# Patient Record
Sex: FEMALE | Race: WHITE | Employment: FULL TIME | URBAN - METROPOLITAN AREA
[De-identification: names, ages, dates, MRNs, and addresses within clinical notes are randomized per-mention and may not be internally consistent; named-entity substitution may affect disease eponyms.]

---

## 2020-11-27 LAB — SARS-COV-2: POSITIVE

## 2020-12-12 ENCOUNTER — APPOINTMENT (OUTPATIENT)
Dept: CT IMAGING | Age: 30
DRG: 177 | End: 2020-12-12
Payer: COMMERCIAL

## 2020-12-12 ENCOUNTER — APPOINTMENT (OUTPATIENT)
Dept: GENERAL RADIOLOGY | Age: 30
DRG: 177 | End: 2020-12-12
Payer: COMMERCIAL

## 2020-12-12 ENCOUNTER — HOSPITAL ENCOUNTER (INPATIENT)
Age: 30
LOS: 3 days | Discharge: HOSPICE | DRG: 177 | End: 2020-12-15
Attending: EMERGENCY MEDICINE | Admitting: INTERNAL MEDICINE
Payer: COMMERCIAL

## 2020-12-12 PROBLEM — A41.9 SEPSIS (HCC): Status: ACTIVE | Noted: 2020-12-12

## 2020-12-12 LAB
A/G RATIO: 1.4 (ref 1.1–2.2)
ALBUMIN SERPL-MCNC: 4.5 G/DL (ref 3.4–5)
ALP BLD-CCNC: 47 U/L (ref 40–129)
ALT SERPL-CCNC: 9 U/L (ref 10–40)
ANION GAP SERPL CALCULATED.3IONS-SCNC: 12 MMOL/L (ref 3–16)
AST SERPL-CCNC: 12 U/L (ref 15–37)
BACTERIA: ABNORMAL /HPF
BASE EXCESS VENOUS: -3.3 MMOL/L (ref -3–3)
BASOPHILS ABSOLUTE: 0 K/UL (ref 0–0.2)
BASOPHILS RELATIVE PERCENT: 0.7 %
BILIRUB SERPL-MCNC: 0.5 MG/DL (ref 0–1)
BILIRUBIN URINE: NEGATIVE
BLOOD, URINE: NEGATIVE
BUN BLDV-MCNC: 12 MG/DL (ref 7–20)
CALCIUM SERPL-MCNC: 9.1 MG/DL (ref 8.3–10.6)
CARBOXYHEMOGLOBIN: 2 % (ref 0–1.5)
CHLORIDE BLD-SCNC: 106 MMOL/L (ref 99–110)
CLARITY: CLEAR
CO2: 21 MMOL/L (ref 21–32)
COLOR: YELLOW
CREAT SERPL-MCNC: 0.8 MG/DL (ref 0.6–1.1)
D DIMER: 372 NG/ML DDU (ref 0–229)
EOSINOPHILS ABSOLUTE: 0.1 K/UL (ref 0–0.6)
EOSINOPHILS RELATIVE PERCENT: 1.9 %
EPITHELIAL CELLS, UA: ABNORMAL /HPF (ref 0–5)
GFR AFRICAN AMERICAN: >60
GFR NON-AFRICAN AMERICAN: >60
GLOBULIN: 3.2 G/DL
GLUCOSE BLD-MCNC: 123 MG/DL (ref 70–99)
GLUCOSE URINE: NEGATIVE MG/DL
HCG QUALITATIVE: NEGATIVE
HCO3 VENOUS: 21.4 MMOL/L (ref 23–29)
HCT VFR BLD CALC: 40 % (ref 36–48)
HEMOGLOBIN: 13.5 G/DL (ref 12–16)
KETONES, URINE: NEGATIVE MG/DL
LACTIC ACID, SEPSIS: 1.3 MMOL/L (ref 0.4–1.9)
LACTIC ACID, SEPSIS: 3.2 MMOL/L (ref 0.4–1.9)
LEUKOCYTE ESTERASE, URINE: ABNORMAL
LYMPHOCYTES ABSOLUTE: 1.7 K/UL (ref 1–5.1)
LYMPHOCYTES RELATIVE PERCENT: 26.8 %
MAGNESIUM: 1.9 MG/DL (ref 1.8–2.4)
MCH RBC QN AUTO: 30.8 PG (ref 26–34)
MCHC RBC AUTO-ENTMCNC: 33.7 G/DL (ref 31–36)
MCV RBC AUTO: 91.4 FL (ref 80–100)
METHEMOGLOBIN VENOUS: 0.5 %
MICROSCOPIC EXAMINATION: YES
MONOCYTES ABSOLUTE: 0.4 K/UL (ref 0–1.3)
MONOCYTES RELATIVE PERCENT: 6.6 %
NEUTROPHILS ABSOLUTE: 4.1 K/UL (ref 1.7–7.7)
NEUTROPHILS RELATIVE PERCENT: 64 %
NITRITE, URINE: POSITIVE
O2 CONTENT, VEN: 19 VOL %
O2 SAT, VEN: 100 %
O2 THERAPY: ABNORMAL
PCO2, VEN: 36.8 MMHG (ref 40–50)
PDW BLD-RTO: 12.6 % (ref 12.4–15.4)
PH UA: 6 (ref 5–8)
PH VENOUS: 7.37 (ref 7.35–7.45)
PLATELET # BLD: 265 K/UL (ref 135–450)
PMV BLD AUTO: 9.9 FL (ref 5–10.5)
PO2, VEN: 160 MMHG (ref 25–40)
POTASSIUM REFLEX MAGNESIUM: 3.3 MMOL/L (ref 3.5–5.1)
PRO-BNP: 61 PG/ML (ref 0–124)
PROCALCITONIN: 0.03 NG/ML (ref 0–0.15)
PROTEIN UA: NEGATIVE MG/DL
RBC # BLD: 4.38 M/UL (ref 4–5.2)
RBC UA: ABNORMAL /HPF (ref 0–4)
SODIUM BLD-SCNC: 139 MMOL/L (ref 136–145)
SPECIFIC GRAVITY UA: 1.01 (ref 1–1.03)
TCO2 CALC VENOUS: 51 MMOL/L
TOTAL PROTEIN: 7.7 G/DL (ref 6.4–8.2)
TROPONIN: <0.01 NG/ML
TSH REFLEX: 1.15 UIU/ML (ref 0.27–4.2)
URINE REFLEX TO CULTURE: ABNORMAL
URINE TYPE: ABNORMAL
UROBILINOGEN, URINE: 0.2 E.U./DL
WBC # BLD: 6.4 K/UL (ref 4–11)
WBC UA: ABNORMAL /HPF (ref 0–5)

## 2020-12-12 PROCEDURE — 6370000000 HC RX 637 (ALT 250 FOR IP): Performed by: PHYSICIAN ASSISTANT

## 2020-12-12 PROCEDURE — 93005 ELECTROCARDIOGRAM TRACING: CPT | Performed by: EMERGENCY MEDICINE

## 2020-12-12 PROCEDURE — 6370000000 HC RX 637 (ALT 250 FOR IP): Performed by: INTERNAL MEDICINE

## 2020-12-12 PROCEDURE — 2580000003 HC RX 258: Performed by: NURSE PRACTITIONER

## 2020-12-12 PROCEDURE — 6360000002 HC RX W HCPCS: Performed by: PHYSICIAN ASSISTANT

## 2020-12-12 PROCEDURE — 80053 COMPREHEN METABOLIC PANEL: CPT

## 2020-12-12 PROCEDURE — 84484 ASSAY OF TROPONIN QUANT: CPT

## 2020-12-12 PROCEDURE — 2580000003 HC RX 258: Performed by: INTERNAL MEDICINE

## 2020-12-12 PROCEDURE — 1200000000 HC SEMI PRIVATE

## 2020-12-12 PROCEDURE — 71045 X-RAY EXAM CHEST 1 VIEW: CPT

## 2020-12-12 PROCEDURE — 6360000002 HC RX W HCPCS: Performed by: INTERNAL MEDICINE

## 2020-12-12 PROCEDURE — 6360000004 HC RX CONTRAST MEDICATION: Performed by: EMERGENCY MEDICINE

## 2020-12-12 PROCEDURE — 82803 BLOOD GASES ANY COMBINATION: CPT

## 2020-12-12 PROCEDURE — 84443 ASSAY THYROID STIM HORMONE: CPT

## 2020-12-12 PROCEDURE — 2580000003 HC RX 258: Performed by: PHYSICIAN ASSISTANT

## 2020-12-12 PROCEDURE — 85025 COMPLETE CBC W/AUTO DIFF WBC: CPT

## 2020-12-12 PROCEDURE — 85379 FIBRIN DEGRADATION QUANT: CPT

## 2020-12-12 PROCEDURE — 36415 COLL VENOUS BLD VENIPUNCTURE: CPT

## 2020-12-12 PROCEDURE — 84145 PROCALCITONIN (PCT): CPT

## 2020-12-12 PROCEDURE — 81001 URINALYSIS AUTO W/SCOPE: CPT

## 2020-12-12 PROCEDURE — 96374 THER/PROPH/DIAG INJ IV PUSH: CPT

## 2020-12-12 PROCEDURE — 71260 CT THORAX DX C+: CPT

## 2020-12-12 PROCEDURE — 84703 CHORIONIC GONADOTROPIN ASSAY: CPT

## 2020-12-12 PROCEDURE — 83605 ASSAY OF LACTIC ACID: CPT

## 2020-12-12 PROCEDURE — 99283 EMERGENCY DEPT VISIT LOW MDM: CPT

## 2020-12-12 PROCEDURE — 83880 ASSAY OF NATRIURETIC PEPTIDE: CPT

## 2020-12-12 PROCEDURE — 83735 ASSAY OF MAGNESIUM: CPT

## 2020-12-12 PROCEDURE — 87040 BLOOD CULTURE FOR BACTERIA: CPT

## 2020-12-12 RX ORDER — SODIUM CHLORIDE 0.9 % (FLUSH) 0.9 %
10 SYRINGE (ML) INJECTION PRN
Status: DISCONTINUED | OUTPATIENT
Start: 2020-12-12 | End: 2020-12-15 | Stop reason: HOSPADM

## 2020-12-12 RX ORDER — ACETAMINOPHEN 650 MG/1
650 SUPPOSITORY RECTAL EVERY 6 HOURS PRN
Status: DISCONTINUED | OUTPATIENT
Start: 2020-12-12 | End: 2020-12-15 | Stop reason: HOSPADM

## 2020-12-12 RX ORDER — SODIUM CHLORIDE, SODIUM LACTATE, POTASSIUM CHLORIDE, CALCIUM CHLORIDE 600; 310; 30; 20 MG/100ML; MG/100ML; MG/100ML; MG/100ML
INJECTION, SOLUTION INTRAVENOUS CONTINUOUS
Status: DISCONTINUED | OUTPATIENT
Start: 2020-12-12 | End: 2020-12-15 | Stop reason: HOSPADM

## 2020-12-12 RX ORDER — SODIUM CHLORIDE 0.9 % (FLUSH) 0.9 %
10 SYRINGE (ML) INJECTION EVERY 12 HOURS SCHEDULED
Status: DISCONTINUED | OUTPATIENT
Start: 2020-12-12 | End: 2020-12-15 | Stop reason: HOSPADM

## 2020-12-12 RX ORDER — MAGNESIUM SULFATE 1 G/100ML
1 INJECTION INTRAVENOUS ONCE
Status: COMPLETED | OUTPATIENT
Start: 2020-12-12 | End: 2020-12-12

## 2020-12-12 RX ORDER — LANOLIN ALCOHOL/MO/W.PET/CERES
3 CREAM (GRAM) TOPICAL NIGHTLY PRN
COMMUNITY

## 2020-12-12 RX ORDER — 0.9 % SODIUM CHLORIDE 0.9 %
500 INTRAVENOUS SOLUTION INTRAVENOUS ONCE
Status: COMPLETED | OUTPATIENT
Start: 2020-12-12 | End: 2020-12-13

## 2020-12-12 RX ORDER — ACETAMINOPHEN 325 MG/1
650 TABLET ORAL EVERY 6 HOURS PRN
Status: DISCONTINUED | OUTPATIENT
Start: 2020-12-12 | End: 2020-12-15 | Stop reason: HOSPADM

## 2020-12-12 RX ORDER — DEXAMETHASONE SODIUM PHOSPHATE 10 MG/ML
10 INJECTION, SOLUTION INTRAMUSCULAR; INTRAVENOUS ONCE
Status: COMPLETED | OUTPATIENT
Start: 2020-12-12 | End: 2020-12-12

## 2020-12-12 RX ORDER — ONDANSETRON 2 MG/ML
4 INJECTION INTRAMUSCULAR; INTRAVENOUS EVERY 6 HOURS PRN
Status: DISCONTINUED | OUTPATIENT
Start: 2020-12-12 | End: 2020-12-15 | Stop reason: HOSPADM

## 2020-12-12 RX ORDER — PROMETHAZINE HYDROCHLORIDE 25 MG/1
12.5 TABLET ORAL EVERY 6 HOURS PRN
Status: DISCONTINUED | OUTPATIENT
Start: 2020-12-12 | End: 2020-12-15 | Stop reason: HOSPADM

## 2020-12-12 RX ORDER — 0.9 % SODIUM CHLORIDE 0.9 %
2000 INTRAVENOUS SOLUTION INTRAVENOUS ONCE
Status: COMPLETED | OUTPATIENT
Start: 2020-12-12 | End: 2020-12-12

## 2020-12-12 RX ORDER — POTASSIUM CHLORIDE 20 MEQ/1
40 TABLET, EXTENDED RELEASE ORAL ONCE
Status: COMPLETED | OUTPATIENT
Start: 2020-12-12 | End: 2020-12-12

## 2020-12-12 RX ORDER — GUAIFENESIN/DEXTROMETHORPHAN 100-10MG/5
5 SYRUP ORAL EVERY 4 HOURS PRN
Status: DISCONTINUED | OUTPATIENT
Start: 2020-12-12 | End: 2020-12-15 | Stop reason: HOSPADM

## 2020-12-12 RX ORDER — POLYETHYLENE GLYCOL 3350 17 G/17G
17 POWDER, FOR SOLUTION ORAL DAILY PRN
Status: DISCONTINUED | OUTPATIENT
Start: 2020-12-12 | End: 2020-12-15 | Stop reason: HOSPADM

## 2020-12-12 RX ADMIN — Medication 1 G: at 13:17

## 2020-12-12 RX ADMIN — DEXAMETHASONE SODIUM PHOSPHATE 10 MG: 10 INJECTION, SOLUTION INTRAMUSCULAR; INTRAVENOUS at 14:42

## 2020-12-12 RX ADMIN — SODIUM CHLORIDE, POTASSIUM CHLORIDE, SODIUM LACTATE AND CALCIUM CHLORIDE: 600; 310; 30; 20 INJECTION, SOLUTION INTRAVENOUS at 14:42

## 2020-12-12 RX ADMIN — ENOXAPARIN SODIUM 40 MG: 40 INJECTION SUBCUTANEOUS at 20:32

## 2020-12-12 RX ADMIN — IOPAMIDOL 75 ML: 755 INJECTION, SOLUTION INTRAVENOUS at 12:36

## 2020-12-12 RX ADMIN — SODIUM CHLORIDE 2000 ML: 9 INJECTION, SOLUTION INTRAVENOUS at 12:21

## 2020-12-12 RX ADMIN — POTASSIUM CHLORIDE 40 MEQ: 1500 TABLET, EXTENDED RELEASE ORAL at 12:21

## 2020-12-12 RX ADMIN — ACETAMINOPHEN 650 MG: 325 TABLET ORAL at 20:32

## 2020-12-12 RX ADMIN — MAGNESIUM SULFATE HEPTAHYDRATE 1 G: 1 INJECTION, SOLUTION INTRAVENOUS at 16:48

## 2020-12-12 RX ADMIN — SODIUM CHLORIDE 500 ML: 9 INJECTION, SOLUTION INTRAVENOUS at 22:04

## 2020-12-12 ASSESSMENT — PAIN SCALES - GENERAL
PAINLEVEL_OUTOF10: 0

## 2020-12-12 ASSESSMENT — ENCOUNTER SYMPTOMS
SHORTNESS OF BREATH: 0
CHEST TIGHTNESS: 0
ABDOMINAL PAIN: 0
NAUSEA: 0
VOMITING: 0
DIARRHEA: 0

## 2020-12-12 NOTE — PROGRESS NOTES
Pt admitted in room 3320 from ED. Pt independent in room. Pt alert and oriented. Pt on IVF and IV abx. Pt reported SOB with exertion, this RN offered oxygen for comfort, pt refused it. Pt O2 sat>95% on room air. Bed is locked and placed in lowest position. Pt educated to call RN in needs. Verbalized understanding. Call light within reach. Will continue to monitor.

## 2020-12-12 NOTE — H&P
HOSPITALISTS HISTORY AND PHYSICAL    12/12/2020 2:34 PM    Patient Information:  Hyun Jackson is a 26 y.o. female [id number]  PCP:  No primary care provider on file. (Tel: None )    Chief complaint:    Chief Complaint   Patient presents with    Palpitations     Pt presents to the ED with reports of heart racing. Pt states that she was monitoring HR and said her HR went up to 178 today. Pt states that when this happens she feels dizzy and naseous. Reports having one small cup of coffee this morning. Denies any cardiac HX         History of Present Illness:  Yajaira Fernandez is a 26 y.o. female was diagnosed with Covid on November 25. Patient felt short of breath for 5 days had low-grade fevers during that time and generalized fatigue. Then patient felt much better however did have watery diarrhea since Covid was diagnosed about 2-3 times a day. Patient also has a pulse ox at home sats have been good 98 hours had persistent tachycardia 100 220 since Covid was diagnosed. This morning patient felt chest pressure along with increased dyspnea diaphoresis nausea and palpitations. She checked her pulse ox and her heart rate was up to 170. Patient also is complaining of increased shortness of breath when lying down flat. Has had increased frequency and urgency with urination no foul-smelling urine. Patient worked up in the ED including CT PE which was negative for pulmonary embolism but did show some signs of viral pneumonia          REVIEW OF SYSTEMS:   Constitutional: See above  ENT: Negative for rhinorrhea, epistaxis, hoarseness, sore throat.   Respiratory: See above  Cardiovascular: See above   gastrointestinal:See above   Genitourinary: Negative for polyuria, dysuria   Hematologic/Lymphatic: Negative for bleeding tendency, easy bruising  Musculoskeletal: Negative for myalgias and arthralgias  Neurologic: Negative for confusion,dysarthria. Skin: Negative for itching,rash  Psychiatric: Negative for depression,anxiety, agitation. Endocrine: Negative for polydipsia,polyuria,heat /cold intolerance. Past Medical History:   has no past medical history on file. Past Surgical History:   has a past surgical history that includes [place] tooth extraction ();  section; and laparoscopic appendectomy (13). Medications:  No current facility-administered medications on file prior to encounter. No current outpatient medications on file prior to encounter. Allergies:  No Known Allergies     Social History:  Patient Lives at home   reports that she has never smoked. She has never used smokeless tobacco. She reports that she does not drink alcohol or use drugs.      Family History:  family history includes Diabetes in her father; Heart Disease in her father; High Blood Pressure in her father, mother, and paternal grandfather; High Cholesterol in her father. ,     Physical Exam:  /67   Pulse 110   Temp 98.2 °F (36.8 °C) (Oral)   Resp 21   Ht 5' 1\" (1.549 m)   Wt 110 lb (49.9 kg)   SpO2 98%   BMI 20.78 kg/m²     Exam for window was COVID-19 pneumonia awake alert oriented x3 able to talk in full sentences no tachypnea or accessory muscle usage no gross focal neurological deficits    Labs:  CBC:   Lab Results   Component Value Date    WBC 6.4 2020    RBC 4.38 2020    HGB 13.5 2020    HCT 40.0 2020    MCV 91.4 2020    MCH 30.8 2020    MCHC 33.7 2020    RDW 12.6 2020     2020    MPV 9.9 2020     BMP:    Lab Results   Component Value Date     2020    K 3.3 2020     2020    CO2 21 2020    BUN 12 2020    CREATININE 0.8 2020    CALCIUM 9.1 2020    GFRAA >60 2020    LABGLOM >60 2020    GLUCOSE 123 2020     CT CHEST PULMONARY EMBOLISM W CONTRAST   Final Result

## 2020-12-12 NOTE — ED NOTES
Patient up walking to bathroom without difficulty. Gait steady. Respirations are easy and unlabored.        Michael Marquez RN  12/12/20 8212

## 2020-12-12 NOTE — ED PROVIDER NOTES
I independently performed a history and physical on [facility]. All diagnostic, treatment, and disposition decisions were made by myself in conjunction with the advanced practice provider. Briefly, this is a 26 y.o. female here for palpitation, nausea, lightheadedness, and chest pressure. Patient also had shortness of breath. This came on earlier today. The patient was diagnosed with Covid the day after Thanksgiving. However, she states she is been mostly asymptomatic since then. She is never had anything like she presented with today. She denies symptoms proceeding today. .    On exam, the patient appears well-hydrated, well-nourished, and in no acute distress. Mucous membranes are moist. Speech is clear. Breathing is unlabored. Skin is dry. Mental status is normal. The patient moves all extremities. Face is symmetric without droop. Heart is tachycardic, but regular. Lungs are CTAB. EKG  The Ekg interpreted by me in the absence of a cardiologist shows. sinus tachycardia, hfrr=611  Axis is   Normal  QTc is  normal  Intervals and Durations are unremarkable. No specific ST-T wave changes appreciated. No evidence of acute ischemia. No previous EKGs available for comparison. MDM  Patient chest x-ray was clear, but her CT showed evidence of bilateral lower lobe pneumonia. This may be viral in nature given her COVID-19 history. However, she is over 2 weeks since her original diagnosis, so this seems less likely. We are treating presumptively for bacterial pneumonia as well. In addition to this, the patient has a urinary tract infection. She did have a history of urinary frequency and hesitancy when I evaluated her at bedside. She meets criteria for sepsis with tachycardia and tachypnea. With her 2 sources of infection, we treated presumptively with IV antibiotics. Blood cultures lactic acid were sent. Her lactic acid was elevated, and indicating decreased tissue perfusion.   She received her 30 L/kg IV fluid bolus and felt better afterwards. She will still need admission for further treatment. FINAL IMPRESSION  1. Sepsis, due to unspecified organism, unspecified whether acute organ dysfunction present ([address]    2. Bacterial urinary tract infection    3. COVID-19 virus infection confirmed 11/27/2020    4. Multifocal pneumonia    5. Palpitations        Blood pressure 108/67, pulse 110, temperature 98.2 °F (36.8 °C), temperature source Oral, resp. rate 21, height 5' 1\" (1.549 m), weight 110 lb (49.9 kg), SpO2 98 %, not currently breastfeeding.      For further details of [address] [place] [address],[address] emergency department encounter, please see documentation by advanced practice provider, LUCAS Wright.       Terence Hugo MD  12/12/20 6640

## 2020-12-12 NOTE — ED NOTES
Report given to 3A RN at bedside. Pt alert and oriented and shows no signs of distress at time of transfer to [facility] [address]. . Pt taken to room by 3A RN in wheelchair.         Monae Alegria RN  12/12/20 2035 NADIA Middleton RN  12/12/20 8963

## 2020-12-12 NOTE — ED PROVIDER NOTES
that her heart rate was that high today. Patient states that she has had a small cup of coffee but not any significant stimulant use. Patient states at the present time she is not currently experiencing any kind of substernal chest pain palpitations or shortness of breath. She denies a history of unilateral leg pain or swelling. She denies a history of DVT and or PE. She reports that her only known risk factor for thromboembolic events is her recent diagnosis of Covid. Patient states that she has no additional complaints voiced at the present time. She is resting comfortably and reports that she is pain-free. Nursing Notes were all reviewed and agreed with or any disagreements were addressed in the HPI. REVIEW OF SYSTEMS    (2-9 systems for level 4, 10 or more for level 5)     Review of Systems   Constitutional: Negative for activity change, chills and fever. Respiratory: Negative for chest tightness and shortness of breath. Cardiovascular: Positive for palpitations. Negative for chest pain. Gastrointestinal: Negative for abdominal pain, diarrhea, nausea and vomiting. Genitourinary: Negative for dysuria and flank pain. Neurological: Negative for seizures and headaches. Positives and Pertinent negatives as per HPI. Except as noted above in the ROS, all other systems were reviewed and negative. PAST MEDICAL HISTORY   History reviewed. No pertinent past medical history. SURGICAL HISTORY     Past Surgical History:   Procedure Laterality Date     SECTION      LAPAROSCOPIC APPENDECTOMY  13    WISDOM TOOTH EXTRACTION           CURRENTMEDICATIONS       Previous Medications    No medications on file         ALLERGIES     Patient has no known allergies.     FAMILYHISTORY       Family History   Problem Relation Age of Onset    High Blood Pressure Mother     Diabetes Father     Heart Disease Father     High Blood Pressure Father     High Cholesterol Father     High Blood Pressure Paternal Grandfather           SOCIAL HISTORY       Social History     Tobacco Use    Smoking status: Never Smoker    Smokeless tobacco: Never Used   Substance Use Topics    Alcohol use: No     Comment: socially     Drug use: No       SCREENINGS             PHYSICAL EXAM    (up to 7 for level 4, 8 or more for level 5)     ED Triage Vitals [12/12/20 1056]   BP Temp Temp Source Pulse Resp SpO2 Height Weight   121/80 98.2 °F (36.8 °C) Oral 142 18 98 % 5' 1\" (1.549 m) 110 lb (49.9 kg)       Physical Exam  Vitals signs and nursing note reviewed. Constitutional:       General: She is awake. She is not in acute distress. Appearance: Normal appearance. She is well-developed and normal weight. She is not ill-appearing or diaphoretic. HENT:      Head: Normocephalic and atraumatic. Right Ear: External ear normal.      Left Ear: External ear normal.   Eyes:      General: No scleral icterus. Right eye: No discharge. Left eye: No discharge. Conjunctiva/sclera: Conjunctivae normal.   Neck:      Musculoskeletal: Normal range of motion. Vascular: No JVD. Cardiovascular:      Rate and Rhythm: Regular rhythm. Tachycardia present. Heart sounds: No murmur. No friction rub. No gallop. Comments: Bilateral calves supple. Negative Homans bilaterally. No evidence of peripheral edema. Pulmonary:      Effort: Pulmonary effort is normal. No accessory muscle usage or respiratory distress. Breath sounds: Normal breath sounds. No wheezing, rhonchi or rales. Abdominal:      General: There is no distension. Palpations: Abdomen is soft. Abdomen is not rigid. There is no mass. Tenderness: There is no abdominal tenderness. There is no guarding or rebound. Skin:     General: Skin is warm and dry. Neurological:      Mental Status: She is alert and oriented to person, place, and time. GCS: GCS eye subscore is 4. GCS verbal subscore is 5.  GCS motor subscore is 6. Cranial Nerves: No cranial nerve deficit. Sensory: No sensory deficit. Coordination: Coordination normal.   Psychiatric:         Behavior: Behavior normal. Behavior is cooperative.          DIAGNOSTIC RESULTS   LABS:    Labs Reviewed   COMPREHENSIVE METABOLIC PANEL W/ REFLEX TO MG FOR LOW K - Abnormal; Notable for the following components:       Result Value    Potassium reflex Magnesium 3.3 (*)     Glucose 123 (*)     ALT 9 (*)     AST 12 (*)     All other components within normal limits    Narrative:     Performed at:  [facility] [address] [facility] [address]   Phone [phone]   URINE RT REFLEX TO CULTURE - Abnormal; Notable for the following components:    Nitrite, Urine POSITIVE (*)     Leukocyte Esterase, Urine SMALL (*)     All other components within normal limits    Narrative:     Performed at:  [facility] [address] [facility] [place]   Phone [phone]   BLOOD GAS, VENOUS - Abnormal; Notable for the following components:    pCO2, Stiven 36.8 (*)     pO2, Stiven 160.0 (*)     HCO3, Venous 21.4 (*)     Base Excess, Stiven -3.3 (*)     Carboxyhemoglobin 2.0 (*)     All other components within normal limits    Narrative:     Performed at:  [facility] [address]   Phone [phone]   LACTATE, SEPSIS - Abnormal; Notable for the following components:    Lactic Acid, Sepsis 3.2 (*)     All other components within normal limits    Narrative:     Performed at:  [facility] [address]   Phone [phone]   D-DIMER, QUANTITATIVE - Abnormal; Notable for the following components:    D-Dimer, Quant 372 (*)     All other components within normal limits    Narrative:     Performed at:  [facility] [address]   Phone [phone]   MICROSCOPIC URINALYSIS - Abnormal; Notable for the following components:    Bacteria, UA 3+ (*)     All other components within normal limits    Narrative:     Performed at:  [facility]  [address], [place] [employer]   Phone [phone]   CULTURE, BLOOD 1   CULTURE, BLOOD 2   CBC WITH AUTO DIFFERENTIAL    Narrative:     Performed at:  [facility]  [address]   Phone [phone]   TROPONIN    Narrative:     Performed at:  [facility] [address] [employer], [address]   Phone [phone]   BRAIN NATRIURETIC PEPTIDE    Narrative:     Performed at:  [facility] [address], [place] [employer]   Phone [phone]   HCG, SERUM, QUALITATIVE    Narrative:     Performed at:  [facility] [address], [place] [employer]   Phone [phone]   MAGNESIUM    Narrative:     Performed at:  [facility] [address], [place] [employer]   Phone [phone]   TSH WITH REFLEX   LACTATE, SEPSIS       All other labs were within normal range or not returned as of this dictation. EKG: All EKG's are interpreted by the Emergency Department Physician in the absence of a cardiologist.  Please see their note for interpretation of EKG. RADIOLOGY:   Non-plain film images such as CT, Ultrasound and MRI are read by the radiologist. Plain radiographic images are visualized and preliminarily interpreted by the ED Provider with the below findings:        Interpretation per the Radiologist below, if available at the time of this note:    CT CHEST PULMONARY EMBOLISM W CONTRAST   Final Result   Patchy heterogeneous bilateral lower lobe consolidation, likely reflecting   viral pneumonia given patient history of COVID-19 infection.       No findings to suggest large central pulmonary embolism. XR CHEST PORTABLE   Final Result   1. No acute abnormality. Xr Chest Portable    Result Date: 12/12/2020  EXAMINATION: ONE XRAY VIEW OF THE CHEST 12/12/2020 11:14 am COMPARISON: None available. HISTORY: ORDERING SYSTEM PROVIDED HISTORY: tachycardia and palpitations TECHNOLOGIST PROVIDED HISTORY: Reason for exam:->tachycardia and palpitations Reason for Exam: Palpitations (Pt presents to the ED with reports of heart racing. Pt states that she was monitoring HR and said her HR went up to 178 today. Pt states that when this happens she feels dizzy and naseous. Reports having one small cup of coffee this morning. Denies any cardiac HX ) Acuity: Unknown Type of Exam: Unknown FINDINGS: The lungs are clear. The cardiac silhouette is within normal limits. There is no pneumothorax or pleural effusion. 1.  No acute abnormality. PROCEDURES   Unless otherwise noted below, none     Procedures    CRITICAL CARE TIME   Because of the high probability of sudden clinical deterioration of the patients condition and to prevent further deterioration, my critical care time involved my full attention to the patients condition, and included chart data review, documentation, medication ordering, viewing the patients old records, reevaluation of the patient's cardiac, pulmonary, and neurological status. Reassessing vital signs. Consutlations with off service physician. Ordering, interpreting reviewing diagnostic testing. Therefore my critical care time was 35 minutes of direct attention to the patients condition and did not include time spent on procedures.     SEP-1 CORE MEASURE DATA    Classification: sepsis    Amount of fluids ordered: at least 30mL/kg based on ideal body weight due to obesity defined as BMI >30 (patient's BMI is Body mass index is 20.78 kg/m².)    Time at which sepsis was identified: 1301    Broad-spectrum antibiotics chosen: based on sepsis order-set for a suspected source of: UTI    Repeat lactate level: pending    On reassessment after fluid resuscitation:   Vitals update: /67   Pulse 110   Temp 98.2 °F (36.8 °C) (Oral)   Resp 21   Ht 5' 1\" (1.549 m)   Wt 110 lb (49.9 kg)   SpO2 98%   BMI 20.78 kg/m² , cardiopulmonary exam: Improving tachycardia, capillary refill: brisk, peripheral pulses: Intact, skin examination: Unchanged    CONSULTS:  None      EMERGENCY DEPARTMENT COURSE and DIFFERENTIAL DIAGNOSIS/MDM:   Vitals:    Vitals:    12/12/20 1056 12/12/20 1217 12/12/20 1230   BP: 121/80 123/71 108/67   Pulse: 142 123 110   Resp: 18 23 21   Temp: 98.2 °F (36.8 °C)     TempSrc: Oral     SpO2: 98%     Weight: 110 lb (49.9 kg)     Height: 5' 1\" (1.549 m)         Patient was given the following medications:  Medications   cefTRIAXone (ROCEPHIN) 1 g in sterile water 10 mL IV syringe (1 g Intravenous Given 12/12/20 1317)   azithromycin (ZITHROMAX) 500 mg in D5W 250ml Vial Mate (has no administration in time range)   dexamethasone (PF) (DECADRON) injection 10 mg (has no administration in time range)   lactated ringers infusion (has no administration in time range)   0.9 % sodium chloride bolus (2,000 mLs Intravenous New Bag 12/12/20 1221)   potassium chloride (KLOR-CON M) extended release tablet 40 mEq (40 mEq Oral Given 12/12/20 1221)   iopamidol (ISOVUE-370) 76 % injection 75 mL (75 mLs Intravenous Given 12/12/20 1236)           The patient's detailed history of present illness is documented as above. Upon arrival to the emergency department the patient's vital signs are as documented. The patient is noted to be hemodynamically stable and afebrile. Physical examination findings are as above. IV access was obtained. Laboratory testing and work-up was initiated. Initial EKG performed upon arrival demonstrates a sinus tachycardia with a rate of 121. No evidence of acute ST elevation.   Please see attending physician details for further EKG interpretation in

## 2020-12-13 LAB
ANION GAP SERPL CALCULATED.3IONS-SCNC: 6 MMOL/L (ref 3–16)
BASOPHILS ABSOLUTE: 0 K/UL (ref 0–0.2)
BASOPHILS RELATIVE PERCENT: 0.5 %
BILIRUBIN URINE: NEGATIVE
BLOOD, URINE: ABNORMAL
BUN BLDV-MCNC: 4 MG/DL (ref 7–20)
CALCIUM SERPL-MCNC: 8.4 MG/DL (ref 8.3–10.6)
CHLORIDE BLD-SCNC: 109 MMOL/L (ref 99–110)
CLARITY: CLEAR
CO2: 24 MMOL/L (ref 21–32)
COLOR: YELLOW
CREAT SERPL-MCNC: 0.6 MG/DL (ref 0.6–1.1)
EKG ATRIAL RATE: 121 BPM
EKG DIAGNOSIS: NORMAL
EKG P AXIS: 56 DEGREES
EKG P-R INTERVAL: 136 MS
EKG Q-T INTERVAL: 434 MS
EKG QRS DURATION: 70 MS
EKG QTC CALCULATION (BAZETT): 616 MS
EKG R AXIS: 63 DEGREES
EKG T AXIS: 68 DEGREES
EKG VENTRICULAR RATE: 121 BPM
EOSINOPHILS ABSOLUTE: 0 K/UL (ref 0–0.6)
EOSINOPHILS RELATIVE PERCENT: 0 %
EPITHELIAL CELLS, UA: 1 /HPF (ref 0–5)
GFR AFRICAN AMERICAN: >60
GFR NON-AFRICAN AMERICAN: >60
GLUCOSE BLD-MCNC: 126 MG/DL (ref 70–99)
GLUCOSE URINE: NEGATIVE MG/DL
HCT VFR BLD CALC: 36.1 % (ref 36–48)
HEMOGLOBIN: 12 G/DL (ref 12–16)
HYALINE CASTS: 1 /LPF (ref 0–8)
KETONES, URINE: NEGATIVE MG/DL
LACTIC ACID: 1.2 MMOL/L (ref 0.4–2)
LEUKOCYTE ESTERASE, URINE: NEGATIVE
LYMPHOCYTES ABSOLUTE: 1.5 K/UL (ref 1–5.1)
LYMPHOCYTES RELATIVE PERCENT: 15.3 %
MCH RBC QN AUTO: 30.9 PG (ref 26–34)
MCHC RBC AUTO-ENTMCNC: 33.2 G/DL (ref 31–36)
MCV RBC AUTO: 93.1 FL (ref 80–100)
MICROSCOPIC EXAMINATION: YES
MONOCYTES ABSOLUTE: 0.6 K/UL (ref 0–1.3)
MONOCYTES RELATIVE PERCENT: 6.6 %
NEUTROPHILS ABSOLUTE: 7.6 K/UL (ref 1.7–7.7)
NEUTROPHILS RELATIVE PERCENT: 77.6 %
NITRITE, URINE: NEGATIVE
PDW BLD-RTO: 12.7 % (ref 12.4–15.4)
PH UA: 7.5 (ref 5–8)
PLATELET # BLD: 224 K/UL (ref 135–450)
PMV BLD AUTO: 10.1 FL (ref 5–10.5)
POTASSIUM REFLEX MAGNESIUM: 4 MMOL/L (ref 3.5–5.1)
PROTEIN UA: NEGATIVE MG/DL
RBC # BLD: 3.87 M/UL (ref 4–5.2)
RBC UA: 85 /HPF (ref 0–4)
SODIUM BLD-SCNC: 139 MMOL/L (ref 136–145)
SPECIFIC GRAVITY UA: 1.01 (ref 1–1.03)
URINE TYPE: ABNORMAL
UROBILINOGEN, URINE: 0.2 E.U./DL
WBC # BLD: 9.8 K/UL (ref 4–11)
WBC UA: 3 /HPF (ref 0–5)

## 2020-12-13 PROCEDURE — 81001 URINALYSIS AUTO W/SCOPE: CPT

## 2020-12-13 PROCEDURE — 87086 URINE CULTURE/COLONY COUNT: CPT

## 2020-12-13 PROCEDURE — 36415 COLL VENOUS BLD VENIPUNCTURE: CPT

## 2020-12-13 PROCEDURE — 6370000000 HC RX 637 (ALT 250 FOR IP): Performed by: INTERNAL MEDICINE

## 2020-12-13 PROCEDURE — 93010 ELECTROCARDIOGRAM REPORT: CPT | Performed by: INTERNAL MEDICINE

## 2020-12-13 PROCEDURE — 2580000003 HC RX 258: Performed by: INTERNAL MEDICINE

## 2020-12-13 PROCEDURE — 1200000000 HC SEMI PRIVATE

## 2020-12-13 PROCEDURE — 2580000003 HC RX 258: Performed by: NURSE PRACTITIONER

## 2020-12-13 PROCEDURE — 83605 ASSAY OF LACTIC ACID: CPT

## 2020-12-13 PROCEDURE — 6360000002 HC RX W HCPCS: Performed by: INTERNAL MEDICINE

## 2020-12-13 PROCEDURE — 6360000002 HC RX W HCPCS: Performed by: PHYSICIAN ASSISTANT

## 2020-12-13 PROCEDURE — 80048 BASIC METABOLIC PNL TOTAL CA: CPT

## 2020-12-13 PROCEDURE — 85025 COMPLETE CBC W/AUTO DIFF WBC: CPT

## 2020-12-13 RX ADMIN — ACETAMINOPHEN 650 MG: 325 TABLET ORAL at 06:48

## 2020-12-13 RX ADMIN — Medication 10 ML: at 08:27

## 2020-12-13 RX ADMIN — ENOXAPARIN SODIUM 40 MG: 40 INJECTION SUBCUTANEOUS at 08:27

## 2020-12-13 RX ADMIN — Medication 1 G: at 11:47

## 2020-12-13 RX ADMIN — PROMETHAZINE HYDROCHLORIDE 12.5 MG: 25 TABLET ORAL at 18:43

## 2020-12-13 RX ADMIN — SODIUM CHLORIDE, POTASSIUM CHLORIDE, SODIUM LACTATE AND CALCIUM CHLORIDE: 600; 310; 30; 20 INJECTION, SOLUTION INTRAVENOUS at 08:27

## 2020-12-13 RX ADMIN — SODIUM CHLORIDE, POTASSIUM CHLORIDE, SODIUM LACTATE AND CALCIUM CHLORIDE: 600; 310; 30; 20 INJECTION, SOLUTION INTRAVENOUS at 02:10

## 2020-12-13 ASSESSMENT — PAIN SCALES - GENERAL
PAINLEVEL_OUTOF10: 0
PAINLEVEL_OUTOF10: 4
PAINLEVEL_OUTOF10: 0
PAINLEVEL_OUTOF10: 0

## 2020-12-13 ASSESSMENT — PAIN DESCRIPTION - DESCRIPTORS: DESCRIPTORS: HEADACHE

## 2020-12-13 NOTE — PROGRESS NOTES
[facility] PROGRESS NOTE    12/13/2020 12:47 PM        Name: Jose Luis Kaufman . Admitted: 12/12/2020  Primary Care Provider: No primary care provider on file. (Tel: None)            Subjective:     Tachycardic overnight up to 150s now in the low 100s not having any chest pain or vomiting    Reviewed interval ancillary notes    Current Medications      cefTRIAXone (ROCEPHIN) 1 g in sterile water 10 mL IV syringe, Q24H      lactated ringers infusion, Continuous      sodium chloride flush 0.9 % injection 10 mL, 2 times per day      sodium chloride flush 0.9 % injection 10 mL, PRN      enoxaparin (LOVENOX) injection 40 mg, Daily      promethazine (PHENERGAN) tablet 12.5 mg, Q6H PRN    Or      ondansetron (ZOFRAN) injection 4 mg, Q6H PRN      polyethylene glycol (GLYCOLAX) packet 17 g, Daily PRN      acetaminophen (TYLENOL) tablet 650 mg, Q6H PRN    Or      acetaminophen (TYLENOL) suppository 650 mg, Q6H PRN      perflutren lipid microspheres (DEFINITY) injection 1.65 mg, ONCE PRN      guaiFENesin-dextromethorphan (ROBITUSSIN DM) 100-10 MG/5ML syrup 5 mL, Q4H PRN        Objective:  /71   Pulse 93   Temp 98.4 °F (36.9 °C) (Oral)   Resp 16   Ht 5' 1\" (1.549 m)   Wt 116 lb 9.6 oz (52.9 kg)   SpO2 99%   BMI 22.03 kg/m²   No intake or output data in the 24 hours ending 12/13/20 1247   Wt Readings from Last 3 Encounters:   12/13/20 116 lb 9.6 oz (52.9 kg)     Examined from  window was COVID-19 pneumonia awake alert oriented x3 able to talk in full sentences no tachypnea or accessory muscle usage no gross focal neurological deficits  Labs and Tests:  CBC:   Recent Labs     12/12/20  1119 12/13/20  0607   WBC 6.4 9.8   HGB 13.5 12.0    224     BMP:    Recent Labs     12/12/20  1119 12/13/20  0607    139   K 3.3* 4.0    109   CO2 21 24   BUN 12 4*   CREATININE 0.8 0.6   GLUCOSE 123* 126* Hepatic:   Recent Labs     12/12/20  1119   AST 12*   ALT 9*   BILITOT 0.5   ALKPHOS 52     CT CHEST PULMONARY EMBOLISM W CONTRAST   Final Result   Patchy heterogeneous bilateral lower lobe consolidation, likely reflecting   viral pneumonia given patient history of COVID-19 infection. No findings to suggest large central pulmonary embolism. XR CHEST PORTABLE   Final Result   1. No acute abnormality. Recent imaging reviewed    Problem List  Active Problems:    Sepsis ([address]  Resolved Problems:    * No resolved hospital problems. *       Assessment & Plan:   Sepsis secondary to ? UTI   continue Rocephin    urine culture pending     Tacyhcardia: 3 of tachycardia to 170 with normal white blood cell count and normal blood pressure unsure i all sepsis induced.   Patient also endorses shortness of breath with lying down flat although normal proBNP we will get echocardiogram to rule out other cardiac causes patient has been tachycardic at baseline since Covid diagnosis of 100 -120  - echo still pending     covid 19 pna: diagnosed nov 25, on room air, no indication for steroids or remdesivir     Hypokalemia replace and recheck    Diet: DIET GENERAL;  Code:Full Code  DVT PPXlovenox  Disposition home pending work up      Geena Vaca MD   12/13/2020 12:47 PM

## 2020-12-14 LAB — URINE CULTURE, ROUTINE: NORMAL

## 2020-12-14 PROCEDURE — 2580000003 HC RX 258: Performed by: INTERNAL MEDICINE

## 2020-12-14 PROCEDURE — 6360000002 HC RX W HCPCS: Performed by: INTERNAL MEDICINE

## 2020-12-14 PROCEDURE — 6370000000 HC RX 637 (ALT 250 FOR IP): Performed by: INTERNAL MEDICINE

## 2020-12-14 PROCEDURE — 99222 1ST HOSP IP/OBS MODERATE 55: CPT | Performed by: INTERNAL MEDICINE

## 2020-12-14 PROCEDURE — 2580000003 HC RX 258: Performed by: NURSE PRACTITIONER

## 2020-12-14 PROCEDURE — 1200000000 HC SEMI PRIVATE

## 2020-12-14 PROCEDURE — 6360000002 HC RX W HCPCS: Performed by: PHYSICIAN ASSISTANT

## 2020-12-14 RX ORDER — TRAMADOL HYDROCHLORIDE 50 MG/1
25 TABLET ORAL ONCE
Status: COMPLETED | OUTPATIENT
Start: 2020-12-14 | End: 2020-12-14

## 2020-12-14 RX ADMIN — ENOXAPARIN SODIUM 30 MG: 30 INJECTION SUBCUTANEOUS at 21:29

## 2020-12-14 RX ADMIN — TRAMADOL HYDROCHLORIDE 25 MG: 50 TABLET, FILM COATED ORAL at 18:09

## 2020-12-14 RX ADMIN — SODIUM CHLORIDE, POTASSIUM CHLORIDE, SODIUM LACTATE AND CALCIUM CHLORIDE: 600; 310; 30; 20 INJECTION, SOLUTION INTRAVENOUS at 09:42

## 2020-12-14 RX ADMIN — Medication 1 G: at 12:26

## 2020-12-14 RX ADMIN — SODIUM CHLORIDE, POTASSIUM CHLORIDE, SODIUM LACTATE AND CALCIUM CHLORIDE: 600; 310; 30; 20 INJECTION, SOLUTION INTRAVENOUS at 17:25

## 2020-12-14 RX ADMIN — PROMETHAZINE HYDROCHLORIDE 12.5 MG: 25 TABLET ORAL at 17:57

## 2020-12-14 RX ADMIN — SODIUM CHLORIDE, POTASSIUM CHLORIDE, SODIUM LACTATE AND CALCIUM CHLORIDE: 600; 310; 30; 20 INJECTION, SOLUTION INTRAVENOUS at 01:48

## 2020-12-14 RX ADMIN — ENOXAPARIN SODIUM 40 MG: 40 INJECTION SUBCUTANEOUS at 09:40

## 2020-12-14 RX ADMIN — Medication 10 ML: at 09:40

## 2020-12-14 RX ADMIN — ACETAMINOPHEN 650 MG: 325 TABLET ORAL at 14:16

## 2020-12-14 ASSESSMENT — PAIN SCALES - GENERAL
PAINLEVEL_OUTOF10: 0
PAINLEVEL_OUTOF10: 3
PAINLEVEL_OUTOF10: 0
PAINLEVEL_OUTOF10: 0
PAINLEVEL_OUTOF10: 6
PAINLEVEL_OUTOF10: 0
PAINLEVEL_OUTOF10: 0

## 2020-12-14 NOTE — PROGRESS NOTES
[facility] PROGRESS NOTE    12/14/2020 2:57 PM        Name: Greyson Ritchie . Admitted: 12/12/2020  Primary Care Provider: No primary care provider on file. (Tel: None)            Subjective:    Patient seen and examined at bedside. She reports recurrent episodes of tachycardia associated with chest pressure and nausea. Admits that this could be anxiety symptoms. Her heart rate goes up to 1 teens transiently during our interaction and then goes back down to 80s.   Reviewed interval ancillary notes    Current Medications      enoxaparin (LOVENOX) injection 30 mg, BID      cefTRIAXone (ROCEPHIN) 1 g in sterile water 10 mL IV syringe, Q24H      lactated ringers infusion, Continuous      sodium chloride flush 0.9 % injection 10 mL, 2 times per day      sodium chloride flush 0.9 % injection 10 mL, PRN      promethazine (PHENERGAN) tablet 12.5 mg, Q6H PRN    Or      ondansetron (ZOFRAN) injection 4 mg, Q6H PRN      polyethylene glycol (GLYCOLAX) packet 17 g, Daily PRN      acetaminophen (TYLENOL) tablet 650 mg, Q6H PRN    Or      acetaminophen (TYLENOL) suppository 650 mg, Q6H PRN      perflutren lipid microspheres (DEFINITY) injection 1.65 mg, ONCE PRN      guaiFENesin-dextromethorphan (ROBITUSSIN DM) 100-10 MG/5ML syrup 5 mL, Q4H PRN        Objective:  /78   Pulse 84   Temp 97.9 °F (36.6 °C) (Oral)   Resp 18   Ht 5' 1\" (1.549 m)   Wt 116 lb 9.6 oz (52.9 kg)   SpO2 97%   BMI 22.03 kg/m²   No intake or output data in the 24 hours ending 12/14/20 1457   Wt Readings from Last 3 Encounters:   12/13/20 116 lb 9.6 oz (52.9 kg)     Examined from  window was COVID-19 pneumonia awake alert oriented x3 able to talk in full sentences no tachypnea or accessory muscle usage no gross focal neurological deficits  Labs and Tests:  CBC:   Recent Labs     12/12/20  1119 12/13/20  0607   WBC 6.4 9.8   HGB 13.5 12.0  224     BMP:    Recent Labs     12/12/20  1119 12/13/20  0607    139   K 3.3* 4.0    109   CO2 21 24   BUN 12 4*   CREATININE 0.8 0.6   GLUCOSE 123* 126*     Hepatic:   Recent Labs     12/12/20  1119   AST 12*   ALT 9*   BILITOT 0.5   ALKPHOS 52     CT CHEST PULMONARY EMBOLISM W CONTRAST   Final Result   Patchy heterogeneous bilateral lower lobe consolidation, likely reflecting   viral pneumonia given patient history of COVID-19 infection. No findings to suggest large central pulmonary embolism. XR CHEST PORTABLE   Final Result   1. No acute abnormality. 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 *       Assessment & Plan:   Sepsis secondary to ? UTI-ruled out   Stop Rocephin    urine culture negative     Tacyhcardia: 3 of tachycardia to 170 with normal white blood cell count and normal blood pressure unsure i all sepsis induced.   Patient also endorses shortness of breath with lying down flat although normal proBNP we will get echocardiogram to rule out other cardiac causes patient has been tachycardic at baseline since Covid diagnosis of 100 -120  - echo still pending  Cardiology consulted, appreciate recommendations     covid 19 pna: diagnosed nov 25, on room air, no indication for steroids or remdesivir     Hypokalemia replace and recheck    Diet: DIET GENERAL;  Code:Full Code  DVT PPXlovenox  Disposition home pending work up      Lauren Freeman MD   12/14/2020 2:57 PM

## 2020-12-14 NOTE — CONSULTS
[address]   Electrophysiology Consultation   Date: 2020  Reason for Consultation: Tachycardia  Consult Requesting Physician: Kenneth Queen MD     Chief Complaint   Patient presents with    Palpitations     Pt presents to the ED with reports of heart racing. Pt states that she was monitoring HR and said her HR went up to 178 today. Pt states that when this happens she feels dizzy and naseous. Reports having one small cup of coffee this morning. Denies any cardiac HX      HPI: Lois Naylor is a 26 y.o. female who was diagnosed with COVID-19 on . Her symptoms were shortness of breath and low-grade fever and fatigue. This morning she had chest pressure with increased dyspnea and diaphoresis and nausea and palpitations. She checked her pulse ox and noticed her heart rate was up to 170. She has also orthopnea. She has had increased frequency of urination. PE was ruled out in the emergency room. History reviewed. No pertinent past medical history. Past Surgical History:   Procedure Laterality Date    APPENDECTOMY       SECTION      LAPAROSCOPIC APPENDECTOMY  13    WISDOM TOOTH EXTRACTION         No Known Allergies    Social History:  Reviewed. reports that she has never smoked. She has never used smokeless tobacco. She reports that she does not drink alcohol or use drugs. Family History:  Reviewed. family history includes Diabetes in her father; Heart Disease in her father; High Blood Pressure in her father, mother, and paternal grandfather; High Cholesterol in her father. Review of System:  All other systems reviewed and are negative except for that noted above. Pertinent negatives are:     Due to the current efforts to prevent transmission of COVID-19 and also the need to preserve PPE for other caregivers, a face-to-face encounter with the patient was not performed.  That being said, all relevant records and diagnostic tests were reviewed, including laboratory results and imaging. Please reference any relevant documentation elsewhere. Care will be coordinated with the primary service. ·     Physical Examination:  Vitals:    20 1215   BP: 112/78   Pulse: 84   Resp: 18   Temp: 97.9 °F (36.6 °C)   SpO2: 97%      No intake/output data recorded. Wt Readings from Last 3 Encounters:   20 116 lb 9.6 oz (52.9 kg)     Temp  Av.1 °F (36.7 °C)  Min: 97.7 °F (36.5 °C)  Max: 98.7 °F (37.1 °C)  Pulse  Av.7  Min: 65  Max: 88  BP  Min: 108/66  Max: 121/78  SpO2  Av %  Min: 97 %  Max: 100 %  No intake or output data in the 24 hours ending 20 1446    · Telemetry: Sinus rhythm possibly sinus tachycardia  Due to the current efforts to prevent transmission of COVID-19 and also the need to preserve PPE for other caregivers, a face-to-face encounter with the patient was not performed. That being said, all relevant records and diagnostic tests were reviewed, including laboratory results and imaging. Please reference any relevant documentation elsewhere. Care will be coordinated with the primary service. ·     Labs, diagnostic and imaging results reviewed. Reviewed.    Recent Labs     20  1119 20  0607    139   K 3.3* 4.0    109   CO2 21 24   BUN 12 4*   CREATININE 0.8 0.6     Recent Labs     20  1119 20  0607   WBC 6.4 9.8   HGB 13.5 12.0   HCT 40.0 36.1   MCV 91.4 93.1    224     Lab Results   Component Value Date    TROPONINI <0.01 2020     No results found for: BNP  No results found for: PROTIME, INR  No results found for: CHOL, HDL, TRIG    ECG: Sinus tachycardiaNonspecific T wave abnormality  Echo:   Cath:   CT of the chest  Impression   Patchy heterogeneous bilateral lower lobe consolidation, likely reflecting   viral pneumonia given patient history of COVID-19 infection.       No findings to suggest large central pulmonary embolism.               Scheduled Meds:   enoxaparin  30

## 2020-12-15 VITALS
RESPIRATION RATE: 16 BRPM | TEMPERATURE: 97.6 F | SYSTOLIC BLOOD PRESSURE: 117 MMHG | DIASTOLIC BLOOD PRESSURE: 78 MMHG | OXYGEN SATURATION: 98 % | WEIGHT: 116.6 LBS | BODY MASS INDEX: 22.01 KG/M2 | HEART RATE: 84 BPM | HEIGHT: 61 IN

## 2020-12-15 LAB — SARS-COV-2, PCR: DETECTED

## 2020-12-15 PROCEDURE — U0003 INFECTIOUS AGENT DETECTION BY NUCLEIC ACID (DNA OR RNA); SEVERE ACUTE RESPIRATORY SYNDROME CORONAVIRUS 2 (SARS-COV-2) (CORONAVIRUS DISEASE [COVID-19]), AMPLIFIED PROBE TECHNIQUE, MAKING USE OF HIGH THROUGHPUT TECHNOLOGIES AS DESCRIBED BY CMS-2020-01-R: HCPCS

## 2020-12-15 PROCEDURE — 2580000003 HC RX 258: Performed by: NURSE PRACTITIONER

## 2020-12-15 PROCEDURE — 6370000000 HC RX 637 (ALT 250 FOR IP): Performed by: INTERNAL MEDICINE

## 2020-12-15 PROCEDURE — 2580000003 HC RX 258: Performed by: INTERNAL MEDICINE

## 2020-12-15 PROCEDURE — 6360000002 HC RX W HCPCS: Performed by: INTERNAL MEDICINE

## 2020-12-15 PROCEDURE — 99232 SBSQ HOSP IP/OBS MODERATE 35: CPT | Performed by: NURSE PRACTITIONER

## 2020-12-15 RX ADMIN — SODIUM CHLORIDE, POTASSIUM CHLORIDE, SODIUM LACTATE AND CALCIUM CHLORIDE: 600; 310; 30; 20 INJECTION, SOLUTION INTRAVENOUS at 10:50

## 2020-12-15 RX ADMIN — ENOXAPARIN SODIUM 30 MG: 30 INJECTION SUBCUTANEOUS at 10:23

## 2020-12-15 RX ADMIN — ACETAMINOPHEN 650 MG: 325 TABLET ORAL at 12:20

## 2020-12-15 RX ADMIN — Medication 10 ML: at 10:23

## 2020-12-15 RX ADMIN — SODIUM CHLORIDE, POTASSIUM CHLORIDE, SODIUM LACTATE AND CALCIUM CHLORIDE: 600; 310; 30; 20 INJECTION, SOLUTION INTRAVENOUS at 02:47

## 2020-12-15 ASSESSMENT — PAIN DESCRIPTION - DESCRIPTORS: DESCRIPTORS: ACHING

## 2020-12-15 ASSESSMENT — PAIN SCALES - GENERAL
PAINLEVEL_OUTOF10: 0
PAINLEVEL_OUTOF10: 0
PAINLEVEL_OUTOF10: 3
PAINLEVEL_OUTOF10: 3
PAINLEVEL_OUTOF10: 0

## 2020-12-15 ASSESSMENT — PAIN DESCRIPTION - LOCATION: LOCATION: GENERALIZED

## 2020-12-15 ASSESSMENT — PAIN DESCRIPTION - PAIN TYPE: TYPE: ACUTE PAIN

## 2020-12-15 NOTE — PROGRESS NOTES
Physician Progress Note      PATIENTRose Stain  [facility] #:                  [id number]  :                       1990  ADMIT DATE:       2020 10:58 AM  DISCH DATE:  RESPONDING  PROVIDER #:        Kaleb Webber MD          QUERY TEXT:    Patient admitted with tachycardia. Noted documentation of sepsis in progress   note on  and . Please indicate one of the following and document in   the medical record: The medical record reflects the following:  Risk Factors: Recent covid 19 infection. Clinical Indicators: UTI ruled out, culture negative. WBC 9.8, albumin 4.5,   d-dimer 372. Positive covid test on . No CRP noted. Chest CT \"Patchy   heterogeneous bilateral lower lobe consolidation, likely reflecting viral   pneumonia given patient history of COVID-19 infection\". Highest recorded temp   99.1. Lactic acid 3.2 down to 1.3. Tachycardia to 170. Treatment: Rocephin stopped. Lactated ringers at 125ml/hr. Monitoring. Options provided:  -- Sepsis present as evidenced by, Please document evidence. -- Sepsis was ruled out after study  -- Sepsis due to covid 19  -- Other - I will add my own diagnosis  -- Disagree - Not applicable / Not valid  -- Disagree - Clinically unable to determine / Unknown  -- Refer to Clinical Documentation Reviewer    PROVIDER RESPONSE TEXT:    Sepsis was ruled out after study.     Query created by: Tony Martinez on 12/15/2020 8:27 AM      Electronically signed by:  Kaleb Webber MD 12/15/2020 8:54 AM

## 2020-12-15 NOTE — PROGRESS NOTES
[facility]   Electrophysiology Progress Note     Date: 12/15/2020  Admit Date: 2020     Reason for consultation: Tachycardia    Chief Complaint:   Chief Complaint   Patient presents with    Palpitations     Pt presents to the ED with reports of heart racing. Pt states that she was monitoring HR and said her HR went up to 178 today. Pt states that when this happens she feels dizzy and naseous. Reports having one small cup of coffee this morning. Denies any cardiac HX        History of Present Illness: History obtained from patient and medical record. Jose Luis Kaufman is a 26 y.o. female with no significant past history. Pt presented to hospital due to heart racing. She was diagnosed with COVID 19 on . She had worsening SOB, chest pressure, fatigue, and nausea. When she checked her pulse ox, her HR was 170 BPM.     Interval Hx: Today, she is being seen for follow up. She remains in covid precautions. Her telemetry shows sinus rhythm, no recurrent tachycardias. Clinical notes reviewed. Telemetry reviewed. No major events overnight. Allergies:  No Known Allergies    Home Meds:  Prior to Visit Medications    Medication Sig Taking? Authorizing Provider   melatonin 3 MG TABS tablet Take 3 mg by mouth nightly as needed Yes Historical Provider, MD      Scheduled Meds:   enoxaparin  30 mg Subcutaneous BID    sodium chloride flush  10 mL Intravenous 2 times per day     Continuous Infusions:   lactated ringers 125 mL/hr at 12/15/20 1050     PRN Meds:sodium chloride flush, promethazine **OR** ondansetron, polyethylene glycol, acetaminophen **OR** acetaminophen, perflutren lipid microspheres, guaiFENesin-dextromethorphan     Past Medical History:  History reviewed. No pertinent past medical history. Past Surgical History:    has a past surgical history that includes [place] tooth extraction ();  section; laparoscopic appendectomy (13); and Appendectomy.      Social History:  Reviewed. reports that she has never smoked. She has never used smokeless tobacco. She reports that she does not drink alcohol or use drugs. Family History:  Reviewed. family history includes Diabetes in her father; Heart Disease in her father; High Blood Pressure in her father, mother, and paternal grandfather; High Cholesterol in her father. Review of Systems:  Due to the current efforts to prevent transmission of COVID-19 and also the need to preserve PPE for other caregivers, a face-to-face encounter with the patient was not performed. That being said, all relevant records and diagnostic tests were reviewed, including laboratory results and imaging. Please reference any relevant documentation elsewhere. Care will be coordinated with the primary service. Physical Examination:  Vitals:    12/15/20 1230   BP: 117/78   Pulse: 84   Resp: 16   Temp: 97.6 °F (36.4 °C)   SpO2: 98%      In: 480 [P.O.:480]  Out: -    Wt Readings from Last 3 Encounters:   12/13/20 116 lb 9.6 oz (52.9 kg)       Intake/Output Summary (Last 24 hours) at 12/15/2020 1309  Last data filed at 12/15/2020 1015  Gross per 24 hour   Intake 480 ml   Output --   Net 480 ml       Telemetry: Personally Reviewed  - Sinus rhythm     Due to the current efforts to prevent transmission of COVID-19 and also the need to preserve PPE for other caregivers, a face-to-face encounter with the patient was not performed. That being said, all relevant records and diagnostic tests were reviewed, including laboratory results and imaging. Please reference any relevant documentation elsewhere. Care will be coordinated with the primary service. Pertinent labs, diagnostic, device, and imaging results reviewed as a part of this visit    Labs:    BMP:   Recent Labs     12/13/20  0607      K 4.0      CO2 24   BUN 4*   CREATININE 0.6     Estimated Creatinine Clearance: 102 mL/min (based on SCr of 0.6 mg/dL).    CBC:   Recent Labs 20  0607   WBC 9.8   HGB 12.0   HCT 36.1   MCV 93.1        Thyroid: No results found for: TSH, Y8BPTTR, V6IIDXU, THYROIDAB  Lipids: No results found for: CHOL, HDL, TRIG  LFTS:   Lab Results   Component Value Date    ALT 9 2020    AST 12 2020    ALKPHOS 47 2020    PROT 7.7 2020    AGRATIO 1.4 2020    BILITOT 0.5 2020     Cardiac Enzymes:   Lab Results   Component Value Date    TROPONINI <0.01 2020     Coags: No results found for: PROTIME, INR    EC20  Sinus tachycardia    ECHO: None    CT Chest: 20  Patchy heterogeneous bilateral lower lobe consolidation, likely reflecting   viral pneumonia given patient history of COVID-19 infection.       No findings to suggest large central pulmonary embolism.         Problem List:   Patient Active Problem List    Diagnosis Date Noted    COVID-19 virus infection     Palpitations     Tachy-deepak syndrome ([address]     Hypokalemia     Sepsis ([facility] [address] 2020        Assessment and Plan:     1. Tachycardia, palpitations   - Likely related to underlying infection with COVID 19, anxiety may also contribute   - No recurrence   - TSH 1.15 (1220)      - Continue to monitor   - If more frequent, may consider low dose BB     - No need for echo at this time, may complete it as outpatient following recovery from [place] consider event monitoring in a few months as outpatient if palpitations continue    2. COVID 19 PNA   - Stable, on room air    No further EP recommendations. May follow up with our office as outpatient in a few months if her palpitations continue. All pertinent information and plan of care discussed with the EP physician. All questions and concerns were addressed to the patient. Alternatives to my treatment were discussed. I have discussed the above stated plan with patient and the nurse. The patient verbalized understanding and agreed with the plan.     Thank you for allowing to us to participate in the care of Yajaira Fernandez.     Lorena Wheatley, BOB-CNP  [facility], [place], operated [facility]   Office: [phone]

## 2020-12-15 NOTE — PROGRESS NOTES
Data- discharge order received, pt verbalized agreement to discharge, disposition to previous residence, no needs for HHC/DME. Action- discharge instructions prepared and given to patient, pt verbalized understanding. Medication information packet given r/t NEW and/or CHANGED prescriptions emphasizing name/purpose/side effects, pt verbalized understanding. Discharge instruction summary: Diet- general, Activity- as tolerated, Primary Care Physician as follows: No primary care provider on file. None f/u appointment with in one week,   Response- Pt belongings gathered, IV removed. Disposition is home (no HHC/DME needs), transported with belongings, taken to lobby via w/c w/ family, no complications.

## 2020-12-16 LAB
BLOOD CULTURE, ROUTINE: NORMAL
CULTURE, BLOOD 2: NORMAL

## 2020-12-17 NOTE — DISCHARGE SUMMARY
Hospital Medicine Discharge Summary    Patient ID: Meghan Garcia      Patient's PCP: No primary care provider on file. Admit Date: 12/12/2020     Discharge Date: 12/15/2020      Admitting Physician: Steve Rollins MD     Discharge Physician: Kamran Wilson MD     Discharge Diagnoses: Active Hospital Problems    Diagnosis    COVID-19 virus infection [U07.1]    Palpitations [R00.2]    Tachy-deepak syndrome ([address] [I49.5]    Hypokalemia [E87.6]    Sepsis ([address] [A41.9]       The patient was seen and examined on day of discharge and this discharge summary is in conjunction with any daily progress note from day of discharge. Hospital Course:     64-year-old female with recent history of COVID-19 infection presented for evaluation of tachycardia. Initially thought to be sepsis related and started on antibiotics. All cultures came back negative, sepsis ruled out. Cardiology consulted, tachycardia appears to be related to recent COVID-19 infection. Physical Exam Performed:     /78   Pulse 84   Temp 97.6 °F (36.4 °C) (Oral)   Resp 16   Ht 5' 1\" (1.549 m)   Wt 116 lb 9.6 oz (52.9 kg)   SpO2 98%   BMI 22.03 kg/m²       General appearance:  No apparent distress, appears stated age and cooperative. HEENT:  Normal cephalic, atraumatic without obvious deformity. Pupils equal, round, and reactive to light. Extra ocular muscles intact. Conjunctivae/corneas clear. Neck: Supple, with full range of motion. No jugular venous distention. Trachea midline. Respiratory:  Normal respiratory effort. Clear to auscultation, bilaterally without Rales/Wheezes/Rhonchi. Cardiovascular:  Regular rate and rhythm with normal S1/S2 without murmurs, rubs or gallops. Abdomen: Soft, non-tender, non-distended with normal bowel sounds. Musculoskeletal:  No clubbing, cyanosis or edema bilaterally. Full range of motion without deformity.   Skin: Skin color, texture, turgor normal.  No rashes or lesions. Neurologic:  Neurovascularly intact without any focal sensory/motor deficits. Cranial nerves: II-XII intact, grossly non-focal.  Psychiatric:  Alert and oriented, thought content appropriate, normal insight  Capillary Refill: Brisk,< 3 seconds   Peripheral Pulses: +2 palpable, equal bilaterally       Labs: For convenience and continuity at follow-up the following most recent labs are provided:      CBC:    Lab Results   Component Value Date    WBC 9.8 12/13/2020    HGB 12.0 12/13/2020    HCT 36.1 12/13/2020     12/13/2020       Renal:    Lab Results   Component Value Date     12/13/2020    K 4.0 12/13/2020     12/13/2020    CO2 24 12/13/2020    BUN 4 12/13/2020    CREATININE 0.6 12/13/2020    CALCIUM 8.4 12/13/2020         Significant Diagnostic Studies    Radiology:   CT CHEST PULMONARY EMBOLISM W CONTRAST   Final Result   Patchy heterogeneous bilateral lower lobe consolidation, likely reflecting   viral pneumonia given patient history of COVID-19 infection. No findings to suggest large central pulmonary embolism. XR CHEST PORTABLE   Final Result   1. No acute abnormality. Consults:     IP CONSULT TO CARDIOLOGY    Disposition: Home    Condition at Discharge: Stable    Discharge Instructions/Follow-up: PCP    Code Status:  Prior     Activity: activity as tolerated    Diet: regular diet      Discharge Medications:     Discharge Medication List as of 12/15/2020  2:54 PM           Details   melatonin 3 MG TABS tablet Take 3 mg by mouth nightly as neededHistorical Med             Time Spent on discharge is more than 30 minutes in the examination, evaluation, counseling and review of medications and discharge plan.       Signed:    Electronically signed by Neli Mejia MD on 12/17/2020 at 5:40 PM

## 2020-12-18 NOTE — ADT AUTH CERT
Sepsis and Other Febrile Illness, without Focal Infection - Care Day 3 (12/14/2020) by Dolly Padgett RN       Review Status Review Entered   Completed 12/17/2020 15:50      Criteria Review      Care Day: 3 Care Date: 12/14/2020 Level of Care: Intermediate Care    Guideline Day 1    Level Of Care    (X) ICU [G] or floor    12/17/2020 3:50 PM EST by [facility] [place] droplet plus isolation    Clinical Status    ( ) * Clinical Indications met [H]    (X) Possible fever, elevated WBC, and altered mental status    12/17/2020 3:49 PM EST by [email or web address]      12/14/20 1215  97.9 (36.6)  18  84  112/78   High fowlers   97  None (Room air)    Activity    (X) Activity as tolerated    Routes    (X) IV fluids, parenteral medications    (X) Diet as tolerated    Interventions    (X) WBC, cultures, chemistries, urinalysis, CXR, other imaging as indicated    ( ) Evaluation for source of fever [I]    12/17/2020 3:49 PM EST by [email or web address]      BELA-19 [[id number]] (Abnormal)Collected: 12/15/20 1025   Updated: 12/15/20 2259   SARS-CoV-2, PCRDETECTED    (X) Possible procedure for source control [J]    Medications    (X) Possible antimicrobial treatment    (X) Possible DVT prophylaxis    * Milestone   Additional Notes   Current Medications       ·  enoxaparin (LOVENOX) injection 30 mg, BID       ·  cefTRIAXone (ROCEPHIN) 1 g in sterile water 10 mL IV syringe, Q24H       ·  lactated ringers infusion, Continuous       ·  sodium chloride flush 0.9 % injection 10 mL, 2 times per day       ·  sodium chloride flush 0.9 % injection 10 mL, PRN       ·  promethazine (PHENERGAN) tablet 12.5 mg, Q6H PRN     Or       ·  ondansetron (ZOFRAN) injection 4 mg, Q6H PRN       ·  polyethylene glycol (GLYCOLAX) packet 17 g, Daily PRN       ·  acetaminophen (TYLENOL) tablet 650 mg, Q6H PRN     Or       ·  acetaminophen (TYLENOL) suppository 650 mg, Q6H PRN       ·  perflutren lipid microspheres (DEFINITY) injection 1.65 mg, ONCE PRN       ·  guaiFENesin-dextromethorphan (ROBITUSSIN DM) 100-10 MG/5ML syrup 5 mL, Q4H PRN                  EP Consult:   HPI: Ronna Foley is a 26 y.o. female who was diagnosed with COVID-19 on November 25.  Her symptoms were shortness of breath and low-grade fever and fatigue.  This morning she had chest pressure with increased dyspnea and diaphoresis and nausea and palpitations.  She checked her pulse ox and noticed her heart rate was up to 170.  She has also orthopnea.  She has had increased frequency of urination.  PE was ruled out in the emergency room. Plan:       -Tachycardia and palpitation       I reviewed the telemetry.  The tachycardia episodes primarily look like sinus tachycardia.  The only slightly unusual feature is that occasionally the heart rate suddenly jumps up and then slowly comes down.  This could be due to her symptoms or anxiety or activity.  At this point I would attribute this tachycardia to her underlying condition of COVID-19 and generalized inflammatory response in her body.  We will monitor and decide about any further testing or intervention later on.  If tachycardia becomes a problem, we can consider low-dose beta-blocker if her blood pressure allows.       -COVID-19 pneumonia   CT findings are mild.       She is on antibiotic.       -Hypokalemia   Replace and monitor. IM:   Subjective:     Patient seen and examined at bedside.  She reports recurrent episodes of tachycardia associated with chest pressure and nausea.  Admits that this could be anxiety symptoms.  Her heart rate goes up to 1 teens transiently during our interaction and then goes back down to 80s. Assessment & Plan:    Sepsis secondary to ?  UTI-ruled out    Stop Rocephin     urine culture negative       Tacyhcardia: 3 of tachycardia to 170 with normal white blood cell count and normal blood pressure unsure i all sepsis induced.  Patient also endorses shortness of breath with lying down flat although normal proBNP we will get echocardiogram to rule out other cardiac causes patient has been tachycardic at baseline since Covid diagnosis of 100 -120   - echo still pending   Cardiology consulted, appreciate recommendations       covid 19 pna: diagnosed nov 25, on room air, no indication for steroids or remdesivir       Hypokalemia replace and recheck

## 2021-01-22 ENCOUNTER — TELEPHONE (OUTPATIENT)
Dept: CARDIOLOGY CLINIC | Age: 31
End: 2021-01-22

## 2021-01-22 RX ORDER — METOPROLOL SUCCINATE 25 MG/1
25 TABLET, EXTENDED RELEASE ORAL DAILY
Qty: 30 TABLET | Refills: 0 | Status: SHIPPED | OUTPATIENT
Start: 2021-01-22 | End: 2021-03-31

## 2021-01-22 RX ORDER — METOPROLOL SUCCINATE 25 MG/1
25 TABLET, EXTENDED RELEASE ORAL DAILY
Qty: 30 TABLET | Refills: 0 | Status: SHIPPED | OUTPATIENT
Start: 2021-01-22 | End: 2021-01-22

## 2021-01-22 NOTE — TELEPHONE ENCOUNTER
Chart reviewed. Start low-dose Toprol to see if improvement. She should also go see her PCP to rule out other possible causes. Please set her up with EP NP in next couple weeks first available.      BOB Moore-CNP

## 2021-01-22 NOTE — TELEPHONE ENCOUNTER
Spoke with patient and relayed instructions. She verbalized understanding and will be scheduling an appointment with EP.

## 2021-01-22 NOTE — TELEPHONE ENCOUNTER
Her HR is still running between 120 and 160. She is still getting lightheaded and SOB . MXA told her if this continued to call him . What should she do ?

## 2021-01-25 ENCOUNTER — OFFICE VISIT (OUTPATIENT)
Dept: FAMILY MEDICINE CLINIC | Age: 31
End: 2021-01-25
Payer: COMMERCIAL

## 2021-01-25 VITALS
BODY MASS INDEX: 21.64 KG/M2 | HEIGHT: 60 IN | SYSTOLIC BLOOD PRESSURE: 108 MMHG | OXYGEN SATURATION: 99 % | DIASTOLIC BLOOD PRESSURE: 70 MMHG | HEART RATE: 91 BPM | TEMPERATURE: 97.1 F | WEIGHT: 110.2 LBS

## 2021-01-25 DIAGNOSIS — R11.0 NAUSEA: ICD-10-CM

## 2021-01-25 DIAGNOSIS — I49.5 TACHY-BRADY SYNDROME (HCC): ICD-10-CM

## 2021-01-25 DIAGNOSIS — Z76.89 ENCOUNTER TO ESTABLISH CARE WITH NEW DOCTOR: Primary | ICD-10-CM

## 2021-01-25 DIAGNOSIS — Z86.16 HISTORY OF 2019 NOVEL CORONAVIRUS DISEASE (COVID-19): ICD-10-CM

## 2021-01-25 DIAGNOSIS — R00.2 HEART PALPITATIONS: ICD-10-CM

## 2021-01-25 PROBLEM — A41.9 SEPSIS (HCC): Status: RESOLVED | Noted: 2020-12-12 | Resolved: 2021-01-25

## 2021-01-25 PROCEDURE — 99204 OFFICE O/P NEW MOD 45 MIN: CPT | Performed by: NURSE PRACTITIONER

## 2021-01-25 RX ORDER — ONDANSETRON 4 MG/1
4 TABLET, FILM COATED ORAL 3 TIMES DAILY PRN
Qty: 15 TABLET | Refills: 0 | Status: SHIPPED | OUTPATIENT
Start: 2021-01-25 | End: 2021-02-02

## 2021-01-25 ASSESSMENT — ENCOUNTER SYMPTOMS
NAUSEA: 0
SHORTNESS OF BREATH: 0
COUGH: 0
VOMITING: 0
DIARRHEA: 0

## 2021-01-25 ASSESSMENT — PATIENT HEALTH QUESTIONNAIRE - PHQ9
SUM OF ALL RESPONSES TO PHQ9 QUESTIONS 1 & 2: 0
SUM OF ALL RESPONSES TO PHQ QUESTIONS 1-9: 0
1. LITTLE INTEREST OR PLEASURE IN DOING THINGS: 0
SUM OF ALL RESPONSES TO PHQ QUESTIONS 1-9: 0
SUM OF ALL RESPONSES TO PHQ QUESTIONS 1-9: 0

## 2021-01-25 NOTE — PATIENT INSTRUCTIONS

## 2021-01-25 NOTE — PROGRESS NOTES
melatonin 3 MG TABS tablet Take 3 mg by mouth nightly as needed       No current facility-administered medications for this visit. Review of Systems   Constitutional: Negative for activity change, appetite change, chills, fatigue and fever. Respiratory: Negative for cough and shortness of breath. Cardiovascular: Positive for palpitations. Negative for chest pain. Gastrointestinal: Negative for diarrhea, nausea and vomiting. Past medical, surgical, family and social history were reviewed and updated with the patient. Objective:    /70 (Site: Left Upper Arm, Position: Sitting, Cuff Size: Medium Adult)   Pulse 91   Temp 97.1 °F (36.2 °C)   Ht 5' 0.25\" (1.53 m)   Wt 110 lb 3.2 oz (50 kg)   LMP 01/18/2021 (Approximate)   SpO2 99%   BMI 21.34 kg/m²   Weight: 110 lb 3.2 oz (50 kg)     BP Readings from Last 3 Encounters:   01/25/21 108/70   12/15/20 117/78     Wt Readings from Last 3 Encounters:   01/25/21 110 lb 3.2 oz (50 kg)   12/13/20 116 lb 9.6 oz (52.9 kg)     Physical Exam  Constitutional:       General: She is not in acute distress. Appearance: She is well-developed. HENT:      Head: Normocephalic and atraumatic. Cardiovascular:      Rate and Rhythm: Normal rate and regular rhythm. Heart sounds: Normal heart sounds, S1 normal and S2 normal.   Pulmonary:      Effort: Pulmonary effort is normal. No respiratory distress. Breath sounds: Normal breath sounds. Skin:     General: Skin is warm and dry. Neurological:      Mental Status: She is alert and oriented to person, place, and time. Psychiatric:         Thought Content: Thought content normal.         Judgment: Judgment normal.       Assessment/Plan    1. Encounter to establish care with new doctor  Brief review of medical records available to me in Epic.   Return to office in 3 months for annual exam.     2. Heart palpitations  Asymptomatic at time of visit - benign physical exam.  With events only happening every few days will put on 30 day monitor with worsening symptoms. Complete Echo with worsening symptoms since hospital discharge 6 weeks prior. Follow up with EP.  - ECHO Complete 2D W Doppler W Color; Future  - Longterm Continuous Cardiac Event Monitor; Future    3. Tachy-deepak syndrome ([address]  Follow up with EP.  - ECHO Complete 2D W Doppler W Color; Future  - Longterm Continuous Cardiac Event Monitor; Future    4. History of 2019 novel coronavirus disease (COVID-19)  - ECHO Complete 2D W Doppler W Color; Future  - Longterm Continuous Cardiac Event Monitor; Future    5. Nausea  Utilize Zofran PRN. - ondansetron (ZOFRAN) 4 MG tablet; Take 1 tablet by mouth 3 times daily as needed for Nausea or Vomiting  Dispense: 15 tablet; Refill: 0     Ruben Jhonatan was counseled regarding symptoms of current diagnosis, course and complications of disease if inadequately treated. Discussed side effects of medications, diagnosis, treatment options, and prognosis along with risks, benefits, complications, and alternatives of treatment including labs, imaging and other studies/treatment targets and goals. She verbalized understanding of instructions and counseling. Return in about 3 months (around 4/25/2021) for annual exam.     Medical decision making of moderate complexity.

## 2021-01-26 NOTE — PROGRESS NOTES
[facility]   Electrophysiology  Jo Milner, APRN-CNP  Attending EP: Dr. Anabel Katz    Date: 2/2/2021  I had the privilege of visiting John Hendrix in the office. Chief Complaint:   Chief Complaint   Patient presents with    Check-Up    Shortness of Breath     History of Present Illness: History obtained from patient and medical record. John Hendrix is 26 y.o. female with no significant past medical history    Pt presented to hospital due to heart racing. She was diagnosed with COVID 19 on November 25th. She had worsening SOB, chest pressure, fatigue, and nausea. When she checked her pulse ox, her HR was 170 BPM.     -Interval history: Today, John Hendrix is being seen for follow up. She is doing fairly well. She was diagnosed with covid 19 in November. She states her tachycardia has gotten worse since that time. Previously had palpitations/tachycardia every 1-2 weeks. Her Apple Watch shows episodes of HR up to the 170s with minimal activity. Pt states that last week she was walking up her steps with laundry and she became very flushed and dizzy. When she checked her watch it showed a pulse rate of 167 BPM. She laid down and felt better after a few minutes. She called the office and was started on Toprol XL, which has helped her symptoms tremendously. We discussed doing an event monitor off her BB to assess for possible arrhythmic cause. She had an echo yesterday and we reviewed the results, which she was relieved to hear. Pt states she continues to be SOB with minimal activity. 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 Denies having chest pain, palpitations, shortness of breath, orthopnea/PND, cough, or dizziness at the time of this visit. With regard to medication therapy the patient has been compliant with prescribed regimen. They have tolerated therapy to date.      Allergies:  No Known Allergies    Home Medications:  Prior to Visit Medications    Medication Sig Taking? Authorizing Provider   metoprolol succinate (TOPROL XL) 25 MG extended release tablet Take 1 tablet by mouth daily Yes BOB Best CNP   melatonin 3 MG TABS tablet Take 3 mg by mouth nightly as needed Yes Historical Provider, MD   ondansetron (ZOFRAN) 4 MG tablet Take 1 tablet by mouth 3 times daily as needed for Nausea or Vomiting  Patient not taking: Reported on 2021  BOB Swartz CNP      Past Medical History:  History reviewed. No pertinent past medical history. Past Surgical History:    has a past surgical history that includes [place] tooth extraction ();  section; laparoscopic appendectomy (11); and Appendectomy. Social History:  Reviewed. reports that she has never smoked. She has never used smokeless tobacco. She reports that she does not drink alcohol or use drugs. Family History:  Reviewed. family history includes Diabetes in her father; Heart Disease in her father; High Blood Pressure in her father, mother, and paternal grandfather; High Cholesterol in her father. Review of System:  · Constitutional: Negative for fever, night sweats, chills, weight changes, or weakness  · Skin: Negative for rash, dry skin, pruritus, bruising, bleeding, blood clots, or changes in skin pigment  · HEENT: Negative for vision changes, ringing in the ears, sore throat, dysphagia, or swollen lymph nodes  · Respiratory: Reviewed in HPI  · Cardiovascular: Reviewed in HPI  · Gastrointestinal: Negative for abdominal pain, N/V/D, constipation, or black/tarry stools  · Genito-Urinary: Negative for dysuria, incontinence, urgency, or hematuria  · Musculoskeletal: Negative for joint swelling, muscle pain, or injuries  · Neurological/Psych: Negative for confusion, seizures, dizziness, headaches, balance issues or TIA-like symptoms.  No anxiety, depression, or insomnia    Physical Examination:  Vitals:    21 1447   BP: 112/80 Pulse: 85   SpO2: 97%      Wt Readings from Last 3 Encounters:   02/02/21 110 lb 9.6 oz (50.2 kg)   01/25/21 110 lb 3.2 oz (50 kg)   12/13/20 116 lb 9.6 oz (52.9 kg)     Constitutional: Cooperative and in no apparent distress, and appears well nourished  Skin: Warm and pink; no pallor, cyanosis, bruising, or clubbing  HEENT: Symmetric and normocephalic. PERRL, EOM intact. Conjunctiva pink with clear sclera. Mucus membranes pink and moist. Teeth intact. Thyroid smooth without nodules or goiter  Respiratory: Respirations symmetric and unlabored. Lungs clear to auscultation bilaterally, no wheezing, rhonchi, or crackles  Cardiovascular:  Regular rate and rhythm. S1/S2 present without murmurs, rubs, or gallops. Peripheral pulses 2+, capillary refill < 3 seconds. No elevation of JVP. No peripheral edema  Gastrointestinal: Abdomen soft and round. Bowel sounds normoactive in all quadrants without tenderness or masses. Musculoskeletal: Bilateral upper and lower extremity strength 5/5 with full ROM. Neurological/Psych: Awake and orientated to person, place and time. Calm affect, appropriate mood. Pertinent labs, diagnostic, device, and imaging results reviewed as a part of this visit    LABS    CBC:   Lab Results   Component Value Date    WBC 9.8 12/13/2020    HGB 12.0 12/13/2020    HCT 36.1 12/13/2020    MCV 93.1 12/13/2020     12/13/2020     BMP:   Lab Results   Component Value Date    CREATININE 0.6 12/13/2020    BUN 4 (L) 12/13/2020     12/13/2020    K 4.0 12/13/2020     12/13/2020    CO2 24 12/13/2020     Estimated Creatinine Clearance: 102 mL/min (based on SCr of 0.6 mg/dL).      Thyroid: No results found for: TSH, D3GSAYA, G1RIOMH, THYROIDAB  Lipid Panel: No results found for: CHOL, HDL, TRIG  LFTs:  Lab Results   Component Value Date    ALT 9 (L) 12/12/2020    AST 12 (L) 12/12/2020    ALKPHOS 47 12/12/2020    BILITOT 0.5 12/12/2020     Coags: No results found for: PROTIME, INR, APTT    ECG: 2/2/2021  - NSR, rate 77, QTc 379    Echo: 2/1/21   *Left ventricle - normal size, thickness and function with EF of 55%   *Tricuspid valve - trivial regurgitation   *Mitral valve - mildly thickened and calcified. GXT: None    Assessment:    1. Tachycardia              - Occurred in setting of COVID 19, anxiety may also contribute              - TSH 1.15 (12/1220)                            - Currently NSR, rate 70s              - Continue BB following monitor completion (Hold while wearing monitor to assess for arrhthymias)      2. Palpitation  - Etiology has not established. Differential diagnosis are arrhythmia including SVT, PACs, or PVCs. Anxiety could also contribute to palpitations  - Echo unremarkable; EF 55% (2/21)    - Diagnostic options including event monitor, loop recorder implant and/or EP study were discussed with patient. Risks, benefits and alternative of each treatment option were explained. All questions answered. ~ 30 day event monitor ordered  - Discussed stress reduction    3. SOB   - On-going since covid infection   - Echo unremarkable   - No s/s of distress today   - Consider CT chest ?    Plan:  1. Check event monitor  2. Hold metoprolol while wearing monitor unless symptoms extreme    F/U: Follow-up with EP in 2 months to review results  -Call [facility] at [phone] with any questions    Diet & Exercise:   The patient is counseled to follow a low salt diet to assure blood pressure remains controlled for cardiovascular risk factor modification   The patient is counseled to avoid excess caffeine, and energy drinks as this may exacerbated ectopy and arrhythmia   The patient is counseled to lose weight to control cardiovascular risk factors   Exercise program discussed: To improve overall cardiovascular health, the patient is instructed to increase cardiovascular related activities with a goal of 150 min/week of moderate level activity or 10,000 steps per day.  Encouraged to perform as much activity as tolerated    Quality Metrics  1. Tobacco Cessation Counseling: N/A  2. Retake of BP if >140/90: N/A   3. Documentation to PCP: Note sent to PCP office visit  4. CAD patient on anti-platelet: N/A  5.   CAD patient on STATIN therapy: N/A   6. Patient with history of CHF and atrial fibrillation on anticoagulation: N/A      I have addressed the patient's cardiac risk factors and adjusted pharmacologic treatment as needed. In addition, I have reinforced the need for patient directed risk factor modification. I independently reviewed the ECG    All questions and concerns were addressed with the patient. Alternatives to treatment were discussed. Thank you for allowing to us to participate in the care of Rafal Briceño. Time  20-29 minutes spent preparing to see patient including reviewing patient history/prior tests/prior consults, performing a medical exam, counseling and educating patient/family/caregiver, ordering medications/tests/procedures, referring and communicating with PCPs and other pertinent consultants, documenting information in the EMR, independently interpreting results and communicating to family and coordination of patient care.     BOB Lundy-CNP  [facility]   Office: [phone]

## 2021-02-01 ENCOUNTER — HOSPITAL ENCOUNTER (OUTPATIENT)
Dept: NON INVASIVE DIAGNOSTICS | Age: 31
Discharge: HOME | End: 2021-02-01
Payer: COMMERCIAL

## 2021-02-01 DIAGNOSIS — R00.2 HEART PALPITATIONS: ICD-10-CM

## 2021-02-01 DIAGNOSIS — Z86.16 HISTORY OF 2019 NOVEL CORONAVIRUS DISEASE (COVID-19): ICD-10-CM

## 2021-02-01 DIAGNOSIS — I49.5 TACHY-BRADY SYNDROME (HCC): ICD-10-CM

## 2021-02-01 LAB
LV EF: 55 %
LVEF MODALITY: NORMAL

## 2021-02-01 PROCEDURE — 93306 TTE W/DOPPLER COMPLETE: CPT

## 2021-02-02 ENCOUNTER — OFFICE VISIT (OUTPATIENT)
Dept: CARDIOLOGY CLINIC | Age: 31
End: 2021-02-02
Payer: COMMERCIAL

## 2021-02-02 VITALS
SYSTOLIC BLOOD PRESSURE: 112 MMHG | HEIGHT: 61 IN | OXYGEN SATURATION: 97 % | WEIGHT: 110.6 LBS | DIASTOLIC BLOOD PRESSURE: 80 MMHG | BODY MASS INDEX: 20.88 KG/M2 | HEART RATE: 85 BPM

## 2021-02-02 DIAGNOSIS — R06.02 SOB (SHORTNESS OF BREATH): ICD-10-CM

## 2021-02-02 DIAGNOSIS — I49.5 TACHY-BRADY SYNDROME (HCC): ICD-10-CM

## 2021-02-02 DIAGNOSIS — I49.5 SICK SINUS SYNDROME (HCC): ICD-10-CM

## 2021-02-02 DIAGNOSIS — R00.2 PALPITATIONS: Primary | ICD-10-CM

## 2021-02-02 PROCEDURE — 93000 ELECTROCARDIOGRAM COMPLETE: CPT | Performed by: NURSE PRACTITIONER

## 2021-02-02 PROCEDURE — 99213 OFFICE O/P EST LOW 20 MIN: CPT | Performed by: NURSE PRACTITIONER

## 2021-02-02 PROCEDURE — 93246 EXT ECG>7D<15D RECORDING: CPT | Performed by: INTERNAL MEDICINE

## 2021-02-23 PROCEDURE — 93248 EXT ECG>7D<15D REV&INTERPJ: CPT | Performed by: INTERNAL MEDICINE

## 2021-03-03 NOTE — PROGRESS NOTES
[facility]   Electrophysiology  Tree Pina, APRN-CNP  Attending EP: Dr. Brinton Goodell    Date: 3/31/2021  I had the privilege of visiting Elisha Bishop in the office. Chief Complaint:   Chief Complaint   Patient presents with    Follow-up     8 weeks, review monitor     History of Present Illness: History obtained from patient and medical record. Elisha Bishop is 33 y.o. female with no significant past medical history    Pt presented to hospital due to heart racing. She was diagnosed with COVID 19 on . She had worsening SOB, chest pressure, fatigue, and nausea. When she checked her pulse ox, her HR was 170 BPM.     -Interval history: Today, Elisha Bishop is being seen for routine follow up. She is doing better. She still has palpitation and heart racing episodes infrequently. They will come out of nowhere. She had an episode a few weeks ago at work. Her HR went up to 140s and then resolved within a few minutes. She feels SOB and very anxious during the episodes. We discussed that her symptoms may be panic attacks. She wants to discuss medication options with her PCP. She is starting a nurse practitioner job soon. Denies having chest pain, palpitations, shortness of breath, orthopnea/PND, cough, or dizziness at the time of this visit. With regard to medication therapy the patient has been compliant with prescribed regimen. They have tolerated therapy to date. Allergies:  No Known Allergies    Home Medications:  Prior to Visit Medications    Medication Sig Taking? Authorizing Provider   melatonin 3 MG TABS tablet Take 3 mg by mouth nightly as needed Yes Historical Provider, MD      Past Medical History:  History reviewed. No pertinent past medical history. Past Surgical History:    has a past surgical history that includes [place] tooth extraction ();  section; laparoscopic appendectomy (13); and Appendectomy. Social History:  Reviewed.   reports that she has never smoked. She has never used smokeless tobacco. She reports that she does not drink alcohol or use drugs. Family History:  Reviewed. family history includes Diabetes in her father; Heart Disease in her father; High Blood Pressure in her father, mother, and paternal grandfather; High Cholesterol in her father. Review of System:  · Constitutional: Negative for fever, night sweats, chills, weight changes, or weakness  · Skin: Negative for rash, dry skin, pruritus, bruising, bleeding, blood clots, or changes in skin pigment  · HEENT: Negative for vision changes, ringing in the ears, sore throat, dysphagia, or swollen lymph nodes  · Respiratory: Reviewed in HPI  · Cardiovascular: Reviewed in HPI  · Gastrointestinal: Negative for abdominal pain, N/V/D, constipation, or black/tarry stools  · Genito-Urinary: Negative for dysuria, incontinence, urgency, or hematuria  · Musculoskeletal: Negative for joint swelling, muscle pain, or injuries  · Neurological/Psych: Negative for confusion, seizures, dizziness, headaches, balance issues or TIA-like symptoms. No anxiety, depression, or insomnia    Physical Examination:  Vitals:    03/31/21 1434   BP: 118/76   Pulse: 130   SpO2: 97%      Wt Readings from Last 3 Encounters:   03/31/21 107 lb (48.5 kg)   02/02/21 110 lb 9.6 oz (50.2 kg)   01/25/21 110 lb 3.2 oz (50 kg)     Constitutional: Cooperative and in no apparent distress, and appears well nourished  Skin: Warm and pink; no pallor, cyanosis, bruising, or clubbing  HEENT: Symmetric and normocephalic. PERRL, EOM intact. Conjunctiva pink with clear sclera. Mucus membranes pink and moist. Teeth intact. Thyroid smooth without nodules or goiter  Respiratory: Respirations symmetric and unlabored. Lungs clear to auscultation bilaterally, no wheezing, rhonchi, or crackles  Cardiovascular:  Regular rate and rhythm. S1/S2 present without murmurs, rubs, or gallops. Peripheral pulses 2+, capillary refill < 3 seconds.  No elevation of JVP. No peripheral edema  Gastrointestinal: Abdomen soft and round. Bowel sounds normoactive in all quadrants without tenderness or masses. Musculoskeletal: Bilateral upper and lower extremity strength 5/5 with full ROM. Neurological/Psych: Awake and orientated to person, place and time. Calm affect, appropriate mood. Pertinent labs, diagnostic, device, and imaging results reviewed as a part of this visit    LABS    CBC:   Lab Results   Component Value Date    WBC 9.8 12/13/2020    HGB 12.0 12/13/2020    HCT 36.1 12/13/2020    MCV 93.1 12/13/2020     12/13/2020     BMP:   Lab Results   Component Value Date    CREATININE 0.6 12/13/2020    BUN 4 (L) 12/13/2020     12/13/2020    K 4.0 12/13/2020     12/13/2020    CO2 24 12/13/2020     Estimated Creatinine Clearance: 101 mL/min (based on SCr of 0.6 mg/dL). Thyroid: No results found for: TSH, J3TOTQJ, P0HZESE, THYROIDAB  Lipid Panel: No results found for: CHOL, HDL, TRIG  LFTs:  Lab Results   Component Value Date    ALT 9 (L) 12/12/2020    AST 12 (L) 12/12/2020    ALKPHOS 47 12/12/2020    BILITOT 0.5 12/12/2020     Coags: No results found for: PROTIME, INR, APTT    ECG: 3/31/2021  - Sinus tachycardia, rate 106, QTc 422    Echo: 2/1/21   *Left ventricle - normal size, thickness and function with EF of 55%   *Tricuspid valve - trivial regurgitation   *Mitral valve - mildly thickened and calcified. GXT: None    Event Monitor: 2/21  NSR/ST (Average HR 82, low 58, high 166)  Episodes of 2nd degree type 1 AV block    Assessment:    1.  Tachycardia              - Occurred in setting of COVID 19, anxiety may also contribute              - TSH 1.15 (12/1220)                            - Currently NSR/ST    ~ Event monitor unremarkable (2/21)              - May have component of inappropriate sinus tachycardia/PAT related to covid   - Discussed home monitoring with Tub[facility] alexandra     - She is not interested in further medical therapy or EP study at this time     2. Palpitation  - Etiology has not established. Differential diagnosis are arrhythmia including SVT, PACs, or PVCs. Anxiety could also contribute to palpitations  - Echo unremarkable; EF 55% (2/21)    - Event monitor (2/21) showed NSR/ST with episodes of Mobitz Type 1 (benign)  - Discussed stress reduction    3. SOB   - Improved, occurs only with \"panic attacks\"   - Echo unremarkable   - No s/s of distress today    Plan:  1. No changes  2. Look into [employer] alexandra  3. Exercise as tolerated    F/U: Follow-up with EP as needed  -Call [facility] at [phone] with any questions    Diet & Exercise:   The patient is counseled to follow a low salt diet to assure blood pressure remains controlled for cardiovascular risk factor modification   The patient is counseled to avoid excess caffeine, and energy drinks as this may exacerbated ectopy and arrhythmia   The patient is counseled to lose weight to control cardiovascular risk factors   Exercise program discussed: To improve overall cardiovascular health, the patient is instructed to increase cardiovascular related activities with a goal of 150 min/week of moderate level activity or 10,000 steps per day. Encouraged to perform as much activity as tolerated     I have addressed the patient's cardiac risk factors and adjusted pharmacologic treatment as needed. In addition, I have reinforced the need for patient directed risk factor modification. I independently reviewed the ECG    All questions and concerns were addressed with the patient. Alternatives to treatment were discussed. Thank you for allowing to us to participate in the care of Lorraine Franco.     Time  20-29 minutes spent preparing to see patient including reviewing patient history/prior tests/prior consults, performing a medical exam, counseling and educating patient/family/caregiver, ordering medications/tests/procedures, referring and communicating with PCPs and other pertinent consultants, documenting information in the EMR, independently interpreting results and communicating to family and coordination of patient care.     Katie Henry, APRN-CNP  [facility]   Office: [phone]

## 2021-03-31 ENCOUNTER — OFFICE VISIT (OUTPATIENT)
Dept: CARDIOLOGY CLINIC | Age: 31
End: 2021-03-31
Payer: COMMERCIAL

## 2021-03-31 VITALS
OXYGEN SATURATION: 97 % | HEIGHT: 61 IN | WEIGHT: 107 LBS | HEART RATE: 130 BPM | SYSTOLIC BLOOD PRESSURE: 118 MMHG | DIASTOLIC BLOOD PRESSURE: 76 MMHG | BODY MASS INDEX: 20.2 KG/M2

## 2021-03-31 DIAGNOSIS — R00.0 TACHYCARDIA: ICD-10-CM

## 2021-03-31 DIAGNOSIS — R00.2 PALPITATIONS: Primary | ICD-10-CM

## 2021-03-31 DIAGNOSIS — R06.02 SOB (SHORTNESS OF BREATH): ICD-10-CM

## 2021-03-31 PROCEDURE — 99214 OFFICE O/P EST MOD 30 MIN: CPT | Performed by: NURSE PRACTITIONER

## 2021-03-31 PROCEDURE — 93000 ELECTROCARDIOGRAM COMPLETE: CPT | Performed by: NURSE PRACTITIONER

## 2021-06-08 ENCOUNTER — OFFICE VISIT (OUTPATIENT)
Dept: FAMILY MEDICINE CLINIC | Age: 31
End: 2021-06-08
Payer: COMMERCIAL

## 2021-06-08 VITALS
DIASTOLIC BLOOD PRESSURE: 64 MMHG | WEIGHT: 108 LBS | HEIGHT: 61 IN | HEART RATE: 119 BPM | SYSTOLIC BLOOD PRESSURE: 112 MMHG | BODY MASS INDEX: 20.39 KG/M2 | OXYGEN SATURATION: 99 % | TEMPERATURE: 98.2 F

## 2021-06-08 DIAGNOSIS — F41.8 SITUATIONAL ANXIETY: Primary | ICD-10-CM

## 2021-06-08 DIAGNOSIS — R53.83 OTHER FATIGUE: ICD-10-CM

## 2021-06-08 DIAGNOSIS — Z13.220 SCREENING FOR CHOLESTEROL LEVEL: ICD-10-CM

## 2021-06-08 DIAGNOSIS — R53.83 OTHER FATIGUE: Primary | ICD-10-CM

## 2021-06-08 PROCEDURE — 99213 OFFICE O/P EST LOW 20 MIN: CPT | Performed by: NURSE PRACTITIONER

## 2021-06-08 RX ORDER — BUPROPION HYDROCHLORIDE 150 MG/1
150 TABLET ORAL EVERY MORNING
Qty: 30 TABLET | Refills: 0 | Status: SHIPPED | OUTPATIENT
Start: 2021-06-08 | End: 2021-12-06

## 2021-06-08 SDOH — ECONOMIC STABILITY: FOOD INSECURITY: WITHIN THE PAST 12 MONTHS, THE FOOD YOU BOUGHT JUST DIDN'T LAST AND YOU DIDN'T HAVE MONEY TO GET MORE.: NEVER TRUE

## 2021-06-08 SDOH — ECONOMIC STABILITY: FOOD INSECURITY: WITHIN THE PAST 12 MONTHS, YOU WORRIED THAT YOUR FOOD WOULD RUN OUT BEFORE YOU GOT MONEY TO BUY MORE.: NEVER TRUE

## 2021-06-08 ASSESSMENT — ENCOUNTER SYMPTOMS
NAUSEA: 0
DIARRHEA: 0
VOMITING: 0
CHEST TIGHTNESS: 1
SHORTNESS OF BREATH: 0
COUGH: 0
WHEEZING: 0
CONSTIPATION: 0

## 2021-06-08 ASSESSMENT — SOCIAL DETERMINANTS OF HEALTH (SDOH): HOW HARD IS IT FOR YOU TO PAY FOR THE VERY BASICS LIKE FOOD, HOUSING, MEDICAL CARE, AND HEATING?: NOT HARD AT ALL

## 2021-06-08 NOTE — PROGRESS NOTES
Marzena Kent  : 1990  Encounter date: 2021    This case 33 y.o. female who presents with  Chief Complaint   Patient presents with    Fatigue     fatigue x 3 wks     Anxiety     F/u on anxiety x 6 months        History of present illness:    HPI Pt is 33year old female for new onset anxiety following COVID. Pt has been seen by cardiology, wore holter monitor, received ECHO, benign exam.  Pt reports no longer taking metoprolol due to SE's. Pt reports feeling palpitations, excessive fatigue. Pt denies history of anxiety or depression, started after [facility] stay x 4 days. Reports good support system. Reports panic attacks cause palpitations, shortness of breath. Denies significant medical history. Pt has previously been prescribed vistaril, has not started due to concerns for sedation/sleepiness. Current Outpatient Medications on File Prior to Visit   Medication Sig Dispense Refill    melatonin 3 MG TABS tablet Take 3 mg by mouth nightly as needed       No current facility-administered medications on file prior to visit. No Known Allergies  History reviewed. No pertinent past medical history. Past Surgical History:   Procedure Laterality Date    APPENDECTOMY       SECTION      LAPAROSCOPIC APPENDECTOMY  13    WISDOM TOOTH EXTRACTION        Family History   Problem Relation Age of Onset    High Blood Pressure Mother     Diabetes Father     Heart Disease Father     High Blood Pressure Father     High Cholesterol Father     High Blood Pressure Paternal Grandfather       Social History     Tobacco Use    Smoking status: Never Smoker    Smokeless tobacco: Never Used   Substance Use Topics    Alcohol use: No     Comment: socially         Review of Systems   Constitutional: Positive for fatigue. Negative for activity change, appetite change and unexpected weight change. Eyes: Negative for visual disturbance. Respiratory: Positive for chest tightness. Negative for cough, shortness of breath and wheezing. Cardiovascular: Positive for palpitations. Negative for chest pain and leg swelling. Gastrointestinal: Negative for constipation, diarrhea, nausea and vomiting. Allergic/Immunologic: Negative for immunocompromised state. Neurological: Negative for dizziness and headaches. Psychiatric/Behavioral: Positive for dysphoric mood and sleep disturbance. Negative for self-injury and suicidal ideas. The patient is nervous/anxious. Objective:    /64 (Site: Right Upper Arm, Position: Sitting, Cuff Size: Medium Adult)   Pulse 119   Temp 98.2 °F (36.8 °C) (Temporal)   Ht 5' 1\" (1.549 m)   Wt 108 lb (49 kg)   SpO2 99%   BMI 20.41 kg/m²   Weight: 108 lb (49 kg)     BP Readings from Last 3 Encounters:   06/08/21 112/64   03/31/21 118/76   02/02/21 112/80     Wt Readings from Last 3 Encounters:   06/08/21 108 lb (49 kg)   03/31/21 107 lb (48.5 kg)   02/02/21 110 lb 9.6 oz (50.2 kg)     BMI Readings from Last 3 Encounters:   06/08/21 20.41 kg/m²   03/31/21 20.22 kg/m²   02/02/21 20.90 kg/m²       Physical Exam  Vitals reviewed. Constitutional:       Appearance: Normal appearance. She is well-developed and normal weight. Eyes:      Extraocular Movements: Extraocular movements intact. Pupils: Pupils are equal, round, and reactive to light. Cardiovascular:      Rate and Rhythm: Regular rhythm. Tachycardia present. Heart sounds: Normal heart sounds. No murmur heard. Pulmonary:      Effort: Pulmonary effort is normal.      Breath sounds: Normal breath sounds. Abdominal:      General: Bowel sounds are normal.      Palpations: Abdomen is soft. Musculoskeletal:      Cervical back: Neck supple. No tenderness. Right lower leg: No edema. Left lower leg: No edema. Lymphadenopathy:      Cervical: No cervical adenopathy. Skin:     General: Skin is warm and dry.    Neurological:      Mental Status: She is alert and oriented to person, place, and time. Psychiatric:      Comments: Tearful, anxious         Assessment/Plan    1. Situational anxiety  Advised vistaril as needed  Discussed coping mechanisms, talking therapy, self care  - buPROPion (WELLBUTRIN XL) 150 MG extended release tablet; Take 1 tablet by mouth every morning  Dispense: 30 tablet; Refill: 0    2. Other fatigue  - Iron and TIBC; Future  - VITAMIN B12 & FOLATE; Future      Return in about 1 month (around 7/8/2021) for annual check up, medication re-check, lab review. This dictation was generated by voice recognition computer software. Although all attempts are made to edit the dictation for accuracy, there may be errors in the transcription that are not intended.

## 2021-11-22 ENCOUNTER — TELEPHONE (OUTPATIENT)
Dept: CARDIOLOGY CLINIC | Age: 31
End: 2021-11-22

## 2021-11-22 NOTE — TELEPHONE ENCOUNTER
Please call and find out which ER so I can look at noted and EKG. Nothing in care everywhere or our records.

## 2021-11-22 NOTE — TELEPHONE ENCOUNTER
Pt calling pt states that she was in the er this past week for chest pains and had an abnormal ekg and was told by the er to follow up with cardiology. Pt has been taking metoprolol 12.5 mg daily. pls call to advise thank you.

## 2021-11-22 NOTE — TELEPHONE ENCOUNTER
Pleas have her continue current dose of metoprolol. If she has another episode she can take additional 1/2 tablet. Please also get her in with NP, likely NPSR in 2-3 months to evalute for recurrence.      Armen Kumar, APRN-CNP

## 2021-12-06 ENCOUNTER — OFFICE VISIT (OUTPATIENT)
Dept: FAMILY MEDICINE CLINIC | Age: 31
End: 2021-12-06
Payer: COMMERCIAL

## 2021-12-06 VITALS
BODY MASS INDEX: 21.16 KG/M2 | DIASTOLIC BLOOD PRESSURE: 70 MMHG | HEART RATE: 107 BPM | OXYGEN SATURATION: 99 % | WEIGHT: 112 LBS | TEMPERATURE: 98.2 F | SYSTOLIC BLOOD PRESSURE: 110 MMHG

## 2021-12-06 DIAGNOSIS — I49.5 TACHY-BRADY SYNDROME (HCC): ICD-10-CM

## 2021-12-06 DIAGNOSIS — R00.2 HEART PALPITATIONS: ICD-10-CM

## 2021-12-06 DIAGNOSIS — F41.8 SITUATIONAL ANXIETY: Primary | ICD-10-CM

## 2021-12-06 PROCEDURE — 99214 OFFICE O/P EST MOD 30 MIN: CPT | Performed by: NURSE PRACTITIONER

## 2021-12-06 RX ORDER — BUSPIRONE HYDROCHLORIDE 5 MG/1
10 TABLET ORAL 2 TIMES DAILY
Qty: 120 TABLET | Refills: 0 | Status: SHIPPED | OUTPATIENT
Start: 2021-12-06 | End: 2022-01-05

## 2021-12-06 ASSESSMENT — ENCOUNTER SYMPTOMS
VOMITING: 0
COUGH: 0
DIARRHEA: 0
NAUSEA: 0
SHORTNESS OF BREATH: 0

## 2021-12-06 NOTE — PATIENT INSTRUCTIONS
Buspar taper:  5mg in pm x 3 days  5mg in am and 5mg in pm x 3 days  5mg in am and 10mg in pm x 3 days  10mg in am and 10mg in pm until next office visit. Take PRN Vistaril.

## 2021-12-06 NOTE — PROGRESS NOTES
Roman Patient  : 1990  Encounter date: 2021    This is a 33 y.o. female who presents with  Chief Complaint   Patient presents with    Follow-up     Seen at Urgent Care- chest pain, tachy and palpitations. History of present illness:    HPI   1. Presents to clinic today for follow up from Urgent care on 21. Was seen for concerns with chest pain, tachycardia and heart palpitations. Does follow with Cardiology - has not made a follow up appointment yet. Reports symptoms have improved since restarting metoprolol at 12.5mg and symptoms have significantly improved - feels like she has returned to about 50% of her baseline. Has had the stomach bug recently which she feels has not helped with her appetite/hydration or anxiety. 2. Did try Wellbutrin in the recent past, but significantly worsened her anxiety. Does feel since she has had COVID that anxiety has been an issue for her - previously no concerns. 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 No Known Allergies  Current Outpatient Medications   Medication Sig Dispense Refill    busPIRone (BUSPAR) 5 MG tablet Take 2 tablets by mouth 2 times daily 120 tablet 0    melatonin 3 MG TABS tablet Take 3 mg by mouth nightly as needed      metoprolol succinate (TOPROL XL) 25 MG extended release tablet Take 0.5 tablets by mouth daily 45 tablet 1     No current facility-administered medications for this visit. Review of Systems   Constitutional: Negative for activity change, appetite change, chills, fatigue and fever. Respiratory: Negative for cough and shortness of breath. Cardiovascular: Negative for chest pain and palpitations. Gastrointestinal: Negative for diarrhea, nausea and vomiting. Psychiatric/Behavioral: The patient is nervous/anxious. Past medical, surgical, family and social history were reviewed and updated with the patient.     Objective:     /70 (Site: Left Upper Arm, Position: Sitting, Cuff Size: Medium Adult)   Pulse 107   Temp 98.2 °F (36.8 °C)   Wt 112 lb (50.8 kg)   SpO2 99%   BMI 21.16 kg/m²   Weight: 112 lb (50.8 kg)     BP Readings from Last 3 Encounters:   12/06/21 110/70   06/08/21 112/64   03/31/21 118/76     Wt Readings from Last 3 Encounters:   12/06/21 112 lb (50.8 kg)   06/08/21 108 lb (49 kg)   03/31/21 107 lb (48.5 kg)     Physical Exam  Constitutional:       General: She is not in acute distress. Appearance: She is well-developed. HENT:      Head: Normocephalic and atraumatic. Cardiovascular:      Rate and Rhythm: Normal rate and regular rhythm. Heart sounds: Normal heart sounds, S1 normal and S2 normal.   Pulmonary:      Effort: Pulmonary effort is normal. No respiratory distress. Breath sounds: Normal breath sounds. Skin:     General: Skin is warm and dry. Neurological:      Mental Status: She is alert and oriented to person, place, and time. Psychiatric:         Mood and Affect: Mood is anxious. Thought Content: Thought content normal.         Judgment: Judgment normal.       Assessment/Plan    1. Situational anxiety  Initiate Buspar taper. Monitor for side effects. Send My chart message in a few weeks to assess efficacy. Otherwise follow up in 3 months - sooner if needed. - busPIRone (BUSPAR) 5 MG tablet; Take 2 tablets by mouth 2 times daily  Dispense: 120 tablet; Refill: 0    2. Heart palpitations  Continue follow up with EP. 3. Tachy-deepak syndrome ([facility] [address]     Ibis Orr was counseled regarding symptoms of current diagnosis, course and complications of disease if inadequately treated. Discussed side effects of medications, diagnosis, treatment options, and prognosis along with risks, benefits, complications, and alternatives of treatment including labs, imaging and other studies/treatment targets and goals. She verbalized understanding of instructions and counseling.     Return in about 3 months (around 3/6/2022) for annual exam,

## 2021-12-14 ENCOUNTER — PATIENT MESSAGE (OUTPATIENT)
Dept: CARDIOLOGY CLINIC | Age: 31
End: 2021-12-14

## 2021-12-14 RX ORDER — METOPROLOL SUCCINATE 25 MG/1
12.5 TABLET, EXTENDED RELEASE ORAL DAILY
Qty: 45 TABLET | Refills: 1 | Status: SHIPPED | OUTPATIENT
Start: 2021-12-14 | End: 2022-02-02 | Stop reason: DRUGHIGH

## 2021-12-14 NOTE — TELEPHONE ENCOUNTER
LOV : 03/31/2021 w/ NPSR    Last Refill : Rx is historical and was abstracted from outside source    NOV : 02/02/2022 w/ NPSR

## 2021-12-14 NOTE — TELEPHONE ENCOUNTER
From: Erin Servin  To: Rosa Veras  Sent: 12/14/2021 2:51 PM EST  Subject: Medication refill    Good afternoon Teresa Burns. I will run out of metoprolol before my apt with you in feb. Can I please get a refill of metoprolol 25mg ER? I have been taking a half tab daily and it has helped ease the chest pain some. Thank you!

## 2022-01-03 NOTE — PROGRESS NOTES
[address]   Electrophysiology  LAITH Tidwell  Attending EP: Dr. Brasher Needs    Date: 2/2/2022  I had the privilege of visiting Venice Bowles in the office. Chief Complaint:   Chief Complaint   Patient presents with    Follow-up    Tachycardia    Palpitations     History of Present Illness: History obtained from patient and medical record. Venice Bowles is 33 y.o. female with no significant past medical history    In December of 2020, pt presented to hospital due to heart racing. She was diagnosed with COVID 19 on November 25th. She had worsening SOB, chest pressure, fatigue, and nausea. When she checked her pulse ox, her HR was 170 BPM. Ep consulted for tachycardia    -Interval history: Today, Venice Bowles is being seen for follow up. She is doing better since starting back on metoprolol. She went to urgent care in November for chest pain. She states she had been working and suddenly became SOB with chest pain. Her EKG shows sinus vs atrial tachycardia at 140 BPM. She states she hadn't been feeling well and felt \"dehydrated\", but this is how she always feels before her heart speeds up. 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 Her PCP has tried her on various anxiety medications, but she did not tolerate them. She states her anxiety has been much better over the past few months. Denies having chest pain, palpitations, shortness of breath, orthopnea/PND, cough, or dizziness at the time of this visit. With regard to medication therapy the patient has been compliant with prescribed regimen. They have tolerated therapy to date. Allergies:  No Known Allergies    Home Medications:  Prior to Visit Medications    Medication Sig Taking?  Authorizing Provider   metoprolol succinate (TOPROL XL) 25 MG extended release tablet Take 0.5 tablets by mouth daily Yes BOB Tidwell CNP   melatonin 3 MG TABS tablet Take 3 mg by mouth nightly as needed Yes Historical Provider, MD      Past Medical History:  History reviewed. No pertinent past medical history. Past Surgical History:    has a past surgical history that includes [place] tooth extraction ();  section; laparoscopic appendectomy (13); and Appendectomy. Social History:  Reviewed. reports that she has never smoked. She has never used smokeless tobacco. She reports that she does not drink alcohol and does not use drugs. Family History:  Reviewed. family history includes Diabetes in her father; Heart Disease in her father; High Blood Pressure in her father, mother, and paternal grandfather; High Cholesterol in her father. Review of System:  · Constitutional: Negative for fever, night sweats, chills, weight changes, or weakness  · Skin: Negative for rash, dry skin, pruritus, bruising, bleeding, blood clots, or changes in skin pigment  · HEENT: Negative for vision changes, ringing in the ears, sore throat, dysphagia, or swollen lymph nodes  · Respiratory: Reviewed in HPI  · Cardiovascular: Reviewed in HPI  · Gastrointestinal: Negative for abdominal pain, N/V/D, constipation, or black/tarry stools  · Genito-Urinary: Negative for dysuria, incontinence, urgency, or hematuria  · Musculoskeletal: Negative for joint swelling, muscle pain, or injuries  · Neurological/Psych: Negative for confusion, seizures, dizziness, headaches, balance issues or TIA-like symptoms. No anxiety, depression, or insomnia    Physical Examination:  Vitals:    22 1352   BP: 128/80   Pulse: 88   SpO2: 99%      Wt Readings from Last 3 Encounters:   22 114 lb 6.4 oz (51.9 kg)   21 112 lb (50.8 kg)   21 108 lb (49 kg)     Constitutional: Cooperative and in no apparent distress, and appears well nourished  Skin: Warm and pink; no pallor, cyanosis, bruising, or clubbing  HEENT: Symmetric and normocephalic. PERRL, EOM intact. Conjunctiva pink with clear sclera.  Mucus membranes pink and moist. Teeth intact. Thyroid smooth without nodules or goiter  Respiratory: Respirations symmetric and unlabored. Lungs clear to auscultation bilaterally, no wheezing, rhonchi, or crackles  Cardiovascular:  Regular rate and rhythm. S1/S2 present without murmurs, rubs, or gallops. Peripheral pulses 2+, capillary refill < 3 seconds. No elevation of JVP. No peripheral edema  Gastrointestinal: Abdomen soft and round. Bowel sounds normoactive in all quadrants without tenderness or masses. Musculoskeletal: Bilateral upper and lower extremity strength 5/5 with full ROM. Neurological/Psych: Awake and orientated to person, place and time. Calm affect, appropriate mood. Pertinent labs, diagnostic, device, and imaging results reviewed as a part of this visit    LABS    CBC:   Lab Results   Component Value Date    WBC 5.1 2021    HGB 14.4 2021    HCT 43.5 2021    MCV 94 2021     2021     BMP:   Lab Results   Component Value Date    CREATININE 0.77 2021    BUN 16 2021     2021    K 5.0 2021     2021    CO2 20 2021     CrCl cannot be calculated (Patient's most recent lab result is older than the maximum 120 days allowed. ). Thyroid: No results found for: TSH, N0QPVAI, G9KBBKR, THYROIDAB  Lipid Panel:   Lab Results   Component Value Date    CHOL 163 2021    HDL 58 2021    TRIG 74 2021     LFTs:  Lab Results   Component Value Date    ALT 14 2021    AST 23 2021    ALKPHOS 60 2021    BILITOT 0.2 2021     Coags: No results found for: PROTIME, INR, APTT    EC2022  - NSR. Rate 88, QRS 78, QTc 373    EC/21  Sinus vs atrial tachycardia. Rate 146. QTc 457    Echo: 21   *Left ventricle - normal size, thickness and function with EF of 55%   *Tricuspid valve - trivial regurgitation   *Mitral valve - mildly thickened and calcified.     GXT: None    Event Monitor:   NSR/ST (Average HR 82, low 58, high 166)  Episodes of 2nd degree type 1 AV block    Assessment:    1. Tachycardia              - No prior history of tachycardia/palpitations prior to having covid in 2020              - TSH 1.15 (12/20)                            - Currently NSR    ~ Event monitor unremarkable (2/21)              - May have component of inappropriate sinus tachycardia/PAT   - Pt would like to be off of medication    ~ Discussed weaning off Toprol XL; instructed her to wean to QOD for a few weekts, then stop it. She may use as needed for episodes of tachycardia/palpitations     - Reassurance provided   - If episodes worsen/become more frequent, repeat monitor vs EP study can be considered     2. Palpitation  - Etiology has not established. Differential diagnosis are arrhythmia including SVT, PACs, or PVCs. Anxiety could also contribute to palpitations  - Echo unremarkable; EF 55% (2/21)    - Event monitor (2/21) showed NSR/ST with episodes of Mobitz Type 1 (benign)  - Discussed stress reduction    3. Anxiety   - Stable   - Off medications   - Encourage exercise and stress relieving activity    Plan:  1. Wean off metoprolol as discussed. May take metoprolol as needed  2. Ok to resume exercise without issue  3. Call if palpitations/tachycardia worsen    F/U: Follow-up with EP every year or as needed  -Call [facility] at [phone] with any questions    Diet & Exercise:   The patient is counseled to follow a low salt diet to assure blood pressure remains controlled for cardiovascular risk factor modification   The patient is counseled to avoid excess caffeine, and energy drinks as this may exacerbated ectopy and arrhythmia   The patient is counseled to lose weight to control cardiovascular risk factors   Exercise program discussed:  To improve overall cardiovascular health, the patient is instructed to increase cardiovascular related activities with a goal of 150 min/week of moderate level activity or 10,000 steps per day. Encouraged to perform as much activity as tolerated     I have addressed the patient's cardiac risk factors and adjusted pharmacologic treatment as needed. In addition, I have reinforced the need for patient directed risk factor modification. I independently reviewed the ECG    All questions and concerns were addressed with the patient. Alternatives to treatment were discussed. Thank you for allowing to us to participate in the care of Silas Magallanes. Time  27 minutes spent preparing to see patient including reviewing patient history/prior tests/prior consults, performing a medical exam, counseling and educating patient/family/caregiver, ordering medications/tests/procedures, referring and communicating with PCPs and other pertinent consultants, documenting information in the EMR, independently interpreting results and communicating to family and coordination of patient care.     BOB Mcclelland-CNP  [facility]   Office: [phone]

## 2022-02-02 ENCOUNTER — OFFICE VISIT (OUTPATIENT)
Dept: CARDIOLOGY CLINIC | Age: 32
End: 2022-02-02
Payer: COMMERCIAL

## 2022-02-02 VITALS
HEART RATE: 88 BPM | WEIGHT: 114.4 LBS | HEIGHT: 61 IN | SYSTOLIC BLOOD PRESSURE: 128 MMHG | DIASTOLIC BLOOD PRESSURE: 80 MMHG | OXYGEN SATURATION: 99 % | BODY MASS INDEX: 21.6 KG/M2

## 2022-02-02 DIAGNOSIS — R06.02 SOB (SHORTNESS OF BREATH): ICD-10-CM

## 2022-02-02 DIAGNOSIS — R00.0 TACHYCARDIA: Primary | ICD-10-CM

## 2022-02-02 DIAGNOSIS — R00.2 PALPITATIONS: ICD-10-CM

## 2022-02-02 PROCEDURE — 99213 OFFICE O/P EST LOW 20 MIN: CPT | Performed by: NURSE PRACTITIONER

## 2022-02-02 PROCEDURE — 93000 ELECTROCARDIOGRAM COMPLETE: CPT | Performed by: NURSE PRACTITIONER

## 2022-02-02 RX ORDER — METOPROLOL SUCCINATE 25 MG/1
12.5 TABLET, EXTENDED RELEASE ORAL DAILY PRN
Qty: 45 TABLET | Refills: 1 | Status: SHIPPED
Start: 2022-02-02

## 2022-08-05 ENCOUNTER — TELEPHONE (OUTPATIENT)
Dept: CARDIOLOGY CLINIC | Age: 32
End: 2022-08-05

## 2022-08-05 NOTE — LETTER
[facility] [place] [address] [address] [facility]  Dept: [phone]  Dept Fax: [phone]    2022    Kiara Mai  : 1990  DOS: 2022    To Whom it May Concern:    Domenico Barrientos has been seen in EP office for tachycardia    Pre-Operative Risk assessment using 2014 ACC/AHA guidelines     Emergent procedure No  Active Cardiac Condition No (decompensated HF, Arrhythmia, MI <3 weeks, severe valve disease)  Risk Level of Procedure Low Risk (endoscopy, superficial skin, breast, ambulatory, or cataract, etc.)  Revised Cardiac Risk Index Risk factors: None  Measurement of Exercise Tolerance before Surgery >4 Yes    According to the 2014 ACC/AHA pre-operative risk assessment guidelines, Domenico Barrientos is a low risk for major cardiac complications during a low risk procedure and may continue as planned. Specific medication recommendations are listed below. Medications recommended to continue should be taken with a sip of water even when NPO. This clinical assessment assumes a full anesthesia/pulmonary/internal medicine evaluation for overall risk of airway management, type and route of anesthetic, and other relevant anesthesia-specific considerations.      Discussion about risks, benefits and alternative of procedure per surgical team.     Av Lagunas, APRN - [address] [facility],[address] serving [place] and [place]

## 2022-11-08 ENCOUNTER — OFFICE VISIT (OUTPATIENT)
Dept: FAMILY MEDICINE CLINIC | Age: 32
End: 2022-11-08
Payer: COMMERCIAL

## 2022-11-08 VITALS
TEMPERATURE: 98.2 F | DIASTOLIC BLOOD PRESSURE: 80 MMHG | BODY MASS INDEX: 20.96 KG/M2 | OXYGEN SATURATION: 98 % | WEIGHT: 111 LBS | HEART RATE: 95 BPM | SYSTOLIC BLOOD PRESSURE: 120 MMHG | HEIGHT: 61 IN

## 2022-11-08 DIAGNOSIS — Z01.818 PREOP EXAMINATION: ICD-10-CM

## 2022-11-08 DIAGNOSIS — Z41.1 ADMISSION FOR BREAST AUGMENTATION: Primary | ICD-10-CM

## 2022-11-08 LAB
ANION GAP SERPL CALCULATED.3IONS-SCNC: 13 MMOL/L (ref 3–16)
APTT: 36.4 SEC (ref 23–34.3)
BASOPHILS ABSOLUTE: 0.1 K/UL (ref 0–0.2)
BASOPHILS RELATIVE PERCENT: 1.3 %
BUN BLDV-MCNC: 10 MG/DL (ref 7–20)
CALCIUM SERPL-MCNC: 9.8 MG/DL (ref 8.3–10.6)
CHLORIDE BLD-SCNC: 101 MMOL/L (ref 99–110)
CO2: 25 MMOL/L (ref 21–32)
CREAT SERPL-MCNC: 0.7 MG/DL (ref 0.6–1.1)
EOSINOPHILS ABSOLUTE: 0.1 K/UL (ref 0–0.6)
EOSINOPHILS RELATIVE PERCENT: 1.7 %
GFR SERPL CREATININE-BSD FRML MDRD: >60 ML/MIN/{1.73_M2}
GLUCOSE BLD-MCNC: 89 MG/DL (ref 70–99)
HCT VFR BLD CALC: 41.7 % (ref 36–48)
HEMOGLOBIN: 14.5 G/DL (ref 12–16)
INR BLD: 1.02 (ref 0.87–1.14)
LYMPHOCYTES ABSOLUTE: 1.4 K/UL (ref 1–5.1)
LYMPHOCYTES RELATIVE PERCENT: 21.5 %
MCH RBC QN AUTO: 31.4 PG (ref 26–34)
MCHC RBC AUTO-ENTMCNC: 34.7 G/DL (ref 31–36)
MCV RBC AUTO: 90.5 FL (ref 80–100)
MONOCYTES ABSOLUTE: 0.3 K/UL (ref 0–1.3)
MONOCYTES RELATIVE PERCENT: 4.5 %
NEUTROPHILS ABSOLUTE: 4.6 K/UL (ref 1.7–7.7)
NEUTROPHILS RELATIVE PERCENT: 71 %
PDW BLD-RTO: 13.3 % (ref 12.4–15.4)
PLATELET # BLD: 204 K/UL (ref 135–450)
PMV BLD AUTO: 11.6 FL (ref 5–10.5)
POTASSIUM SERPL-SCNC: 4.4 MMOL/L (ref 3.5–5.1)
PROTHROMBIN TIME: 13.3 SEC (ref 11.7–14.5)
RBC # BLD: 4.6 M/UL (ref 4–5.2)
SODIUM BLD-SCNC: 139 MMOL/L (ref 136–145)
WBC # BLD: 6.5 K/UL (ref 4–11)

## 2022-11-08 PROCEDURE — 99213 OFFICE O/P EST LOW 20 MIN: CPT | Performed by: NURSE PRACTITIONER

## 2022-11-08 PROCEDURE — 93000 ELECTROCARDIOGRAM COMPLETE: CPT | Performed by: NURSE PRACTITIONER

## 2022-11-08 RX ORDER — M-VIT,TX,IRON,MINS/CALC/FOLIC 27MG-0.4MG
1 TABLET ORAL DAILY
COMMUNITY

## 2022-11-08 ASSESSMENT — PATIENT HEALTH QUESTIONNAIRE - PHQ9
SUM OF ALL RESPONSES TO PHQ QUESTIONS 1-9: 0
2. FEELING DOWN, DEPRESSED OR HOPELESS: 0
SUM OF ALL RESPONSES TO PHQ9 QUESTIONS 1 & 2: 0
SUM OF ALL RESPONSES TO PHQ QUESTIONS 1-9: 0
1. LITTLE INTEREST OR PLEASURE IN DOING THINGS: 0

## 2022-11-08 NOTE — PROGRESS NOTES
Preoperative Consultation    Maya Walter  YOB: 1990    This patient presents to the office today for a preoperative consultation at the request of surgeon, Dr. Hallie Reyes, who plans on performing breast augmentation and abdominoplasty on  at [facility]. Planned anesthesia: General   Known anesthesia problems: None   Bleeding risk: No recent or remote history of abnormal bleeding  Personal or FH of DVT/PE: No      Patient Active Problem List   Diagnosis    History of 2019 novel coronavirus disease (COVID-19)    Palpitations    Tachycardia    SOB (shortness of breath)     Past Surgical History:   Procedure Laterality Date    APPENDECTOMY       SECTION      LAPAROSCOPIC APPENDECTOMY  13    WISDOM TOOTH EXTRACTION         No Known Allergies  Outpatient Medications Marked as Taking for the 22 encounter (Office Visit) with BOB Islas NP   Medication Sig Dispense Refill    Multiple Vitamins-Minerals (THERAPEUTIC MULTIVITAMIN-MINERALS) tablet Take 1 tablet by mouth daily         Social History     Tobacco Use    Smoking status: Never    Smokeless tobacco: Never   Substance Use Topics    Alcohol use: No     Comment: socially      Family History   Problem Relation Age of Onset    High Blood Pressure Mother     Diabetes Father     Heart Disease Father     High Blood Pressure Father     High Cholesterol Father     High Blood Pressure Paternal Grandfather        Review of Systems  A comprehensive review of systems was negative except for what was noted in the HPI. Physical Exam   /80   Pulse 95   Temp 98.2 °F (36.8 °C)   Ht 5' 0.75\" (1.543 m)   Wt 111 lb (50.3 kg)   SpO2 98%   BMI 21.15 kg/m²   Weight: 111 lb (50.3 kg)   Constitutional: She is oriented to person, place, and time. She appears well-developed and well-nourished. No distress. HENT:   Head: Normocephalic and atraumatic.    Mouth/Throat: Uvula is midline, oropharynx is clear and moist and mucous membranes are normal.   Eyes: Conjunctivae and EOM are normal. Pupils are equal, round, and reactive to light. Neck: Trachea normal and normal range of motion. Neck supple. No JVD present. Carotid bruit is not present. No mass and no thyromegaly present. Cardiovascular: Normal rate, regular rhythm, normal heart sounds and intact distal pulses. Exam reveals no gallop and no friction rub. No murmur heard. Pulmonary/Chest: Effort normal and breath sounds normal. No respiratory distress. She has no wheezes. She has no rales. Abdominal: Soft. Normal aorta and bowel sounds are normal. She exhibits no distension and no mass. There is no hepatosplenomegaly. No tenderness. Musculoskeletal: She exhibits no edema and no tenderness. Neurological: She is alert and oriented to person, place, and time. She has normal strength. No cranial nerve deficit or sensory deficit. Coordination and gait normal.   Skin: Skin is warm and dry. No rash noted. No erythema. EKG Interpretation:  normal EKG, normal sinus rhythm. Lab Review orders placed- CBC, BMP, pt, ptt/inr       Assessment:       [place] was seen today for pre-op exam.    Diagnoses and all orders for this visit:    Admission for breast augmentation    Preop examination  -     Cancel: EKG 12 Lead  -     EKG 12 Lead    27 y.o. patient  approved for Surgery         Plan:     1. Preoperative workup as follows: ECG, labs  2. Change in medication regimen before surgery: Hold all medications on morning of surgery, hold multivitamin 5 days prior  3. No contraindications to planned surgery    Electronically signed by BOB Antonio NP on 11/8/22 at 8:45 AM EST     This dictation was generated by voice recognition computer software. Although all attempts are made to edit the dictation for accuracy, there may be errors in the transcription that are not intended.

## 2023-01-04 NOTE — PROGRESS NOTES
[facility]   Electrophysiology  Danella Landing, APRN-CNP  Attending EP: Dr. Dos Santos    Date: 2023  I had the privilege of visiting Mendez Carbajal in the office. Chief Complaint:   Chief Complaint   Patient presents with    Palpitations     History of Present Illness: History obtained from patient and medical record. Mendez Carbajal is 27 y.o. female with no significant past medical history    In 2020, pt presented to hospital due to heart racing. She was diagnosed with COVID 19 on . She had worsening SOB, chest pressure, fatigue, and nausea. When she checked her pulse ox, her HR was 170 BPM. Ep consulted for tachycardia    -Interval history: Today, Mendez Carbajal is being seen for EP follow up. She is doing well. She occasionally has palpitations. They worsen when she is stressed and anxious. She lost her father in October. 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 Denies having chest pain, palpitations, shortness of breath, orthopnea/PND, cough, or dizziness at the time of this visit. With regard to medication therapy the patient has been compliant with prescribed regimen. They have tolerated therapy to date. Allergies:  No Known Allergies    Home Medications:  Prior to Visit Medications    Medication Sig Taking? Authorizing Provider   Multiple Vitamins-Minerals (THERAPEUTIC MULTIVITAMIN-MINERALS) tablet Take 1 tablet by mouth daily Yes Historical Provider, MD      Past Medical History:  History reviewed. No pertinent past medical history. Past Surgical History:    has a past surgical history that includes [place] tooth extraction ();  section; laparoscopic appendectomy (2013); Appendectomy; and Breast enhancement surgery (Bilateral, 2022). Social History:  Reviewed. reports that she has never smoked. She has never used smokeless tobacco. She reports that she does not drink alcohol and does not use drugs. Family History:  Reviewed. family history includes Diabetes in her father; Heart Disease in her father; High Blood Pressure in her father, mother, and paternal grandfather; High Cholesterol in her father. Review of System:  Constitutional: Negative for fever, night sweats, chills, weight changes, or weakness  Skin: Negative for rash, dry skin, pruritus, bruising, bleeding, blood clots, or changes in skin pigment  HEENT: Negative for vision changes, ringing in the ears, sore throat, dysphagia, or swollen lymph nodes  Respiratory: Reviewed in HPI  Cardiovascular: Reviewed in HPI  Gastrointestinal: Negative for abdominal pain, N/V/D, constipation, or black/tarry stools  Genito-Urinary: Negative for dysuria, incontinence, urgency, or hematuria  Musculoskeletal: Negative for joint swelling, muscle pain, or injuries  Neurological/Psych: Negative for confusion, seizures, dizziness, headaches, balance issues or TIA-like symptoms. No anxiety, depression, or insomnia    Physical Examination:  Vitals:    02/02/23 1318   BP: 115/70   Pulse: 91   SpO2: 98%        Wt Readings from Last 3 Encounters:   02/02/23 111 lb 6.4 oz (50.5 kg)   11/08/22 111 lb (50.3 kg)   02/02/22 114 lb 6.4 oz (51.9 kg)     Constitutional: Cooperative and in no apparent distress, and appears well nourished  Skin: Warm and pink; no pallor, cyanosis, bruising, or clubbing  HEENT: Symmetric and normocephalic. PERRL, EOM intact. Conjunctiva pink with clear sclera. Mucus membranes pink and moist. Teeth intact. Thyroid smooth without nodules or goiter  Respiratory: Respirations symmetric and unlabored. Lungs clear to auscultation bilaterally, no wheezing, rhonchi, or crackles  Cardiovascular:  Regular rate and rhythm. S1/S2 present without murmurs, rubs, or gallops. Peripheral pulses 2+, capillary refill < 3 seconds. No elevation of JVP. No peripheral edema  Gastrointestinal: Abdomen soft and round.  Bowel sounds normoactive in all quadrants without tenderness or masses. Musculoskeletal: Bilateral upper and lower extremity strength 5/5 with full ROM. Neurological/Psych: Awake and orientated to person, place and time. Calm affect, appropriate mood. Pertinent labs, diagnostic, device, and imaging results reviewed as a part of this visit    LABS    CBC:   Lab Results   Component Value Date    WBC 6.5 2022    HGB 14.5 2022    HCT 41.7 2022    MCV 90.5 2022     2022     BMP:   Lab Results   Component Value Date    CREATININE 0.7 2022    BUN 10 2022     2022    K 4.4 2022     2022    CO2 25 2022     Estimated Creatinine Clearance: 81 mL/min (based on SCr of 0.7 mg/dL). Thyroid: No results found for: TSH, T4EXTLK, C9CRURP, THYROIDAB  Lipid Panel:   Lab Results   Component Value Date/Time    CHOL 163 2021 08:53 AM    HDL 58 2021 08:53 AM    TRIG 74 2021 08:53 AM     LFTs:  Lab Results   Component Value Date    ALT 14 2021    AST 23 2021    ALKPHOS 60 2021    BILITOT 0.2 2021     Coags:   Lab Results   Component Value Date    PROTIME 13.3 2022    INR 1.02 2022    APTT 36.4 (H) 2022       EC2023  - NSR. Rate 93, QRS 74, QTc 389    EC/21  Sinus vs atrial tachycardia. Rate 146. QTc 457    Echo: 21   *Left ventricle - normal size, thickness and function with EF of 55%   *Tricuspid valve - trivial regurgitation   *Mitral valve - mildly thickened and calcified. GXT: None    Event Monitor:   NSR/ST (Average HR 82, low 58, high 166)  Episodes of 2nd degree type 1 AV block    Assessment:    1. Tachycardia              - No prior history of tachycardia/palpitations prior to having covid in               - TSH 1.15 ()                            - Currently NSR    ~ Event monitor unremarkable ()   - Resolved          - Off beta blocker.  Ok to use PRN for tachycardia/palpitations     - Reassurance provided   - If episodes worsen/become more frequent, repeat monitor in future     2. Palpitation  - Etiology has not established. Differential diagnosis are arrhythmia including SVT, PACs, or PVCs. Anxiety could also contribute to palpitations  - Echo unremarkable; EF 55% (2/21)    - Event monitor (2/21) showed NSR/ST with episodes of [place] Type 1 (benign)  - Discussed stress reduction and exercise to help symptoms  - Ok to use BB as needed. She will call if she has any issues    3. Anxiety   - Stable   - Off medical therapy   - Encourage exercise and stress relieving activity    Plan:  1. No changes  2. Ok to take metoprolol as needed  3. Ok to exercise as tolerated    F/U: Follow-up with EP as needed  -Call [address] at [phone] with any questions    Diet & Exercise: The patient is counseled to follow a low salt diet to assure blood pressure remains controlled for cardiovascular risk factor modification  The patient is counseled to avoid excess caffeine, and energy drinks as this may exacerbated ectopy and arrhythmia  The patient is counseled to lose weight to control cardiovascular risk factors  Exercise program discussed: To improve overall cardiovascular health, the patient is instructed to increase cardiovascular related activities with a goal of 150 min/week of moderate level activity or 10,000 steps per day. Encouraged to perform as much activity as tolerated     I have addressed the patient's cardiac risk factors and adjusted pharmacologic treatment as needed. In addition, I have reinforced the need for patient directed risk factor modification. I independently reviewed the ECG    All questions and concerns were addressed with the patient. Alternatives to treatment were discussed. Thank you for allowing to us to participate in the care of Dolly Barrettm.     Time  25 minutes spent preparing to see patient including reviewing patient history/prior tests/prior consults, performing a medical exam, counseling and educating patient/family/caregiver, ordering medications/tests/procedures, referring and communicating with PCPs and other pertinent consultants, documenting information in the EMR, independently interpreting results and communicating to family and coordination of patient care.     [facility]  [address]   Office: [phone]

## 2023-02-02 ENCOUNTER — OFFICE VISIT (OUTPATIENT)
Dept: CARDIOLOGY CLINIC | Age: 33
End: 2023-02-02
Payer: COMMERCIAL

## 2023-02-02 VITALS
HEIGHT: 60 IN | SYSTOLIC BLOOD PRESSURE: 115 MMHG | WEIGHT: 111.4 LBS | BODY MASS INDEX: 21.87 KG/M2 | OXYGEN SATURATION: 98 % | DIASTOLIC BLOOD PRESSURE: 70 MMHG | HEART RATE: 91 BPM

## 2023-02-02 DIAGNOSIS — R00.0 TACHYCARDIA: ICD-10-CM

## 2023-02-02 DIAGNOSIS — F41.9 ANXIETY: ICD-10-CM

## 2023-02-02 DIAGNOSIS — R06.02 SOB (SHORTNESS OF BREATH): ICD-10-CM

## 2023-02-02 DIAGNOSIS — R00.2 PALPITATIONS: Primary | ICD-10-CM

## 2023-02-02 PROCEDURE — 93000 ELECTROCARDIOGRAM COMPLETE: CPT | Performed by: NURSE PRACTITIONER

## 2023-02-02 PROCEDURE — 99213 OFFICE O/P EST LOW 20 MIN: CPT | Performed by: NURSE PRACTITIONER

## 2023-10-31 ENCOUNTER — TELEPHONE (OUTPATIENT)
Dept: FAMILY MEDICINE CLINIC | Age: 33
End: 2023-10-31

## 2023-10-31 NOTE — TELEPHONE ENCOUNTER
----- Message from Coco Haq sent at 10/31/2023 12:01 PM EDT -----  Subject: Message to Provider    QUESTIONS  Information for Provider? PATIENT WAS REFERRED TO RHEUMATOLOGY FROM   [facility] AND THE RHEUMATOLOGIST WOULD LIKE ANY PAST MEDICAL HISTORY FAX   TO? Herve Cohen, [address] PHONE? UNKNOWN  ---------------------------------------------------------------------------  --------------  Rolanda Gilford KSELANA  [phone]; OK to leave message on voicemail  ---------------------------------------------------------------------------  --------------  SCRIPT ANSWERS  Relationship to Patient?  Self